# Patient Record
Sex: FEMALE | Race: WHITE | NOT HISPANIC OR LATINO | Employment: FULL TIME | URBAN - METROPOLITAN AREA
[De-identification: names, ages, dates, MRNs, and addresses within clinical notes are randomized per-mention and may not be internally consistent; named-entity substitution may affect disease eponyms.]

---

## 2017-11-07 ENCOUNTER — TELEPHONE (OUTPATIENT)
Dept: PRIMARY CARE CLINIC | Facility: CLINIC | Age: 32
End: 2017-11-07

## 2017-11-07 NOTE — TELEPHONE ENCOUNTER
----- Message from Saundra Martinez sent at 11/6/2017  8:27 AM CST -----  Contact: Patient  Marek, patient [phone], Calling for same day appointment, asthma. Please advise. Thanks.

## 2019-09-18 ENCOUNTER — OFFICE VISIT (OUTPATIENT)
Dept: PRIMARY CARE CLINIC | Facility: CLINIC | Age: 34
End: 2019-09-18
Payer: COMMERCIAL

## 2019-09-18 VITALS
HEIGHT: 61 IN | RESPIRATION RATE: 18 BRPM | HEART RATE: 65 BPM | BODY MASS INDEX: 30.96 KG/M2 | OXYGEN SATURATION: 100 % | WEIGHT: 164 LBS | TEMPERATURE: 99 F | SYSTOLIC BLOOD PRESSURE: 122 MMHG | DIASTOLIC BLOOD PRESSURE: 73 MMHG

## 2019-09-18 DIAGNOSIS — Z86.2 HISTORY OF ACQUIRED HEMOLYTIC ANEMIA: ICD-10-CM

## 2019-09-18 DIAGNOSIS — F41.9 ANXIETY: ICD-10-CM

## 2019-09-18 DIAGNOSIS — J45.909 ASTHMA, UNSPECIFIED ASTHMA SEVERITY, UNSPECIFIED WHETHER COMPLICATED, UNSPECIFIED WHETHER PERSISTENT: ICD-10-CM

## 2019-09-18 DIAGNOSIS — R53.83 LETHARGY: ICD-10-CM

## 2019-09-18 DIAGNOSIS — R53.1 WEAKNESS: ICD-10-CM

## 2019-09-18 DIAGNOSIS — R01.1 HEART MURMUR: ICD-10-CM

## 2019-09-18 PROCEDURE — 99213 OFFICE O/P EST LOW 20 MIN: CPT | Mod: S$GLB,,, | Performed by: FAMILY MEDICINE

## 2019-09-18 PROCEDURE — 3008F PR BODY MASS INDEX (BMI) DOCUMENTED: ICD-10-PCS | Mod: CPTII,S$GLB,, | Performed by: FAMILY MEDICINE

## 2019-09-18 PROCEDURE — 3008F BODY MASS INDEX DOCD: CPT | Mod: CPTII,S$GLB,, | Performed by: FAMILY MEDICINE

## 2019-09-18 PROCEDURE — 99213 PR OFFICE/OUTPT VISIT, EST, LEVL III, 20-29 MIN: ICD-10-PCS | Mod: S$GLB,,, | Performed by: FAMILY MEDICINE

## 2019-09-18 PROCEDURE — 99999 PR PBB SHADOW E&M-EST. PATIENT-LVL III: ICD-10-PCS | Mod: PBBFAC,,, | Performed by: FAMILY MEDICINE

## 2019-09-18 PROCEDURE — 99999 PR PBB SHADOW E&M-EST. PATIENT-LVL III: CPT | Mod: PBBFAC,,, | Performed by: FAMILY MEDICINE

## 2019-09-18 RX ORDER — MONTELUKAST SODIUM 10 MG/1
10 TABLET ORAL NIGHTLY
COMMUNITY
End: 2023-08-30 | Stop reason: SDUPTHER

## 2019-09-18 RX ORDER — ALBUTEROL SULFATE 0.63 MG/3ML
0.63 SOLUTION RESPIRATORY (INHALATION) EVERY 6 HOURS PRN
COMMUNITY
End: 2019-10-14 | Stop reason: SDUPTHER

## 2019-09-18 RX ORDER — FLUTICASONE PROPIONATE AND SALMETEROL XINAFOATE 230; 21 UG/1; UG/1
2 AEROSOL, METERED RESPIRATORY (INHALATION) 2 TIMES DAILY
COMMUNITY
End: 2021-02-10 | Stop reason: SDUPTHER

## 2019-09-18 NOTE — PROGRESS NOTES
Subjective:       Patient ID: Marek Thompson is a 33 y.o. female.    Chief Complaint: Establish Care; Fatigue; and Bleeding/Bruising    HPI:  33-year-old female in for new PCP--feeling weak recently 2-3 weeks--notice bruising on the posterior legs bilaterally--x 1 week--bruising does appear isolated to the legs.  Patient does have a history of anemia in the past requiring blood transfusions--since transfusions has had a hysterectomy.        No epistaxis, hemoptysis, hematuria, hematochezia or melena no vaginal bleed.     ROS:  Skin: no psoriasis, eczema, skin cancer  HEENT: No headache, ocular pain, blurred vision, diplopia, epistaxis, hoarseness change in voice, thyroid trouble  Lung: No pneumonia, +asthma--patient has inhalers--albuterol/Advair/Singulair--gets exacerbation with weather changes,no  Tb, wheezing, SOB,   Heart: No chest pain, ankle edema, palpitations, MI, +angy murmur,no  hypertension, hyperlipidemia --no stent bypass arrhythmia  Abdomen: No nausea, vomiting, diarrhea, constipation, ulcers, hepatitis, gallbladder disease, melena, hematochezia, hematemesis  : no UTI, renal disease, stones  GYN hyst no vaginal discharge  MS: no fractures, O/A, lupus, rheumatoid, gout  Neuro: No dizziness, LOC, seizures   No diabetes, +a nemia, + anxiety, no depression  , 2 children 10 and 4 , work [employer]  2 chills     Objective:   Physical Exam:  General: Well nourished, well developed, no acute distress no significant weakness at this time   Skin: No lesions  HEENT: Eyes PERRLA, EOM intact, nose patent, throat non-erythematous ears TMs clear  NECK: Supple, no bruits, No JVD, no nodes---thyroid is prominent  Lungs: Clear, no rales, rhonchi, wheezing  Heart: Regular rate and rhythm, no murmurs, gallops, or rubs--very mild heart murmur  Abdomen: flat, bowel sounds positive, no tenderness, or organomegaly  MS: Range of motion and muscle strength intact  Neuro: Alert, CN intact, oriented X  3  Extremities: No cyanosis, clubbing, or edema         Assessment:       1. Lethargy    2. Weakness    3. Asthma, unspecified asthma severity, unspecified whether complicated, unspecified whether persistent    4. History of acquired hemolytic anemia    5. Anxiety    6. Heart murmur        Plan:       Lethargy    Weakness    Asthma, unspecified asthma severity, unspecified whether complicated, unspecified whether persistent  -     CBC auto differential; Future; Expected date: 09/18/2019  -     Comprehensive metabolic panel; Future; Expected date: 09/18/2019  -     EKG 12-lead; Future  -     Fecal Immunochemical Test (iFOBT); Future; Expected date: 09/18/2019  -     Lipid panel; Future; Expected date: 09/18/2019  -     X-Ray Chest PA And Lateral; Future; Expected date: 09/18/2019  -     POCT urine dipstick without microscope  -     T4, free; Future; Expected date: 09/18/2019  -     TSH; Future; Expected date: 09/18/2019    History of acquired hemolytic anemia    Anxiety    Heart murmur      patient with weakness/lethargy/bruising posterior legs/history of anemia with transfusion in the past status post hysterectomy/anxiety/slightly prominent thyroid/question heart murmur--  Patient needs routine lab CBCs CMP lipids T4 TSH stool guaiac UA chest x-ray EKG is physical  After lab done multi vitamin B12--if anemic will need anemia workup--if lethargy stays may need echocardiogram evaluate borderline heart murmur--and may need thyroid scan for slightly prominent thyroid

## 2019-09-30 ENCOUNTER — TELEPHONE (OUTPATIENT)
Dept: PRIMARY CARE CLINIC | Facility: CLINIC | Age: 34
End: 2019-09-30

## 2019-09-30 NOTE — TELEPHONE ENCOUNTER
----- Message from Jazmine Sullivan sent at 9/30/2019  9:13 AM CDT -----  Contact: pt [phone]  Patient would like to get test results  Name of test (lab, mammo, etc.):   Labs,xray  Date of test:  9/26  Ordering provider: Prisca  Where was the test performed:    Would the patient rather a call back or a response via [facility]?:  Call back   Comments:

## 2019-10-14 NOTE — TELEPHONE ENCOUNTER
----- Message from Saundra Martinez sent at 10/14/2019  1:05 PM CDT -----  Contact: Patient  Type:  RX Refill Request    Who Called:  Marek, patient  Refill or New Rx:  Refill  RX Name and Strength:  albuterol (ACCUNEB) 0.63 mg/3 mL Nebu  How is the patient currently taking it? (ex. 1XDay):  Take 0.63 mg by nebulization every 6 (six) hours as needed  Is this a 30 day or 90 day RX:  ?  Preferred Pharmacy with phone number:    [facility]/pharmacy #[id number] - [place] - [address]  [address] [address]  Phone: [phone] Fax: [phone]  Local or Mail Order:  Local  Ordering Provider:  Dr Prisca Gerber Call Back Number:  [phone]  Additional Information:  Please advise. Thanks.

## 2019-10-15 RX ORDER — ALBUTEROL SULFATE 0.63 MG/3ML
0.63 SOLUTION RESPIRATORY (INHALATION) EVERY 6 HOURS PRN
Qty: 1 BOX | Refills: 5 | Status: SHIPPED | OUTPATIENT
Start: 2019-10-15

## 2020-02-06 ENCOUNTER — OFFICE VISIT (OUTPATIENT)
Dept: PRIMARY CARE CLINIC | Facility: CLINIC | Age: 35
End: 2020-02-06
Payer: COMMERCIAL

## 2020-02-06 VITALS
TEMPERATURE: 98 F | DIASTOLIC BLOOD PRESSURE: 78 MMHG | WEIGHT: 161.25 LBS | OXYGEN SATURATION: 97 % | HEIGHT: 61 IN | SYSTOLIC BLOOD PRESSURE: 102 MMHG | BODY MASS INDEX: 30.44 KG/M2 | RESPIRATION RATE: 16 BRPM | HEART RATE: 71 BPM

## 2020-02-06 DIAGNOSIS — Z86.2 HISTORY OF ACQUIRED HEMOLYTIC ANEMIA: ICD-10-CM

## 2020-02-06 DIAGNOSIS — J32.9 SINUSITIS, UNSPECIFIED CHRONICITY, UNSPECIFIED LOCATION: ICD-10-CM

## 2020-02-06 DIAGNOSIS — J45.41 MODERATE PERSISTENT ASTHMA WITH EXACERBATION: ICD-10-CM

## 2020-02-06 DIAGNOSIS — J40 BRONCHITIS: ICD-10-CM

## 2020-02-06 DIAGNOSIS — R01.1 HEART MURMUR: Primary | ICD-10-CM

## 2020-02-06 PROCEDURE — 99213 PR OFFICE/OUTPT VISIT, EST, LEVL III, 20-29 MIN: ICD-10-PCS | Mod: 25,S$GLB,, | Performed by: FAMILY MEDICINE

## 2020-02-06 PROCEDURE — 96372 THER/PROPH/DIAG INJ SC/IM: CPT | Mod: S$GLB,,, | Performed by: FAMILY MEDICINE

## 2020-02-06 PROCEDURE — 99999 PR PBB SHADOW E&M-EST. PATIENT-LVL III: ICD-10-PCS | Mod: PBBFAC,,, | Performed by: FAMILY MEDICINE

## 2020-02-06 PROCEDURE — 3008F BODY MASS INDEX DOCD: CPT | Mod: CPTII,S$GLB,, | Performed by: FAMILY MEDICINE

## 2020-02-06 PROCEDURE — 96372 PR INJECTION,THERAP/PROPH/DIAG2ST, IM OR SUBCUT: ICD-10-PCS | Mod: S$GLB,,, | Performed by: FAMILY MEDICINE

## 2020-02-06 PROCEDURE — 99999 PR PBB SHADOW E&M-EST. PATIENT-LVL III: CPT | Mod: PBBFAC,,, | Performed by: FAMILY MEDICINE

## 2020-02-06 PROCEDURE — 99213 OFFICE O/P EST LOW 20 MIN: CPT | Mod: 25,S$GLB,, | Performed by: FAMILY MEDICINE

## 2020-02-06 PROCEDURE — 3008F PR BODY MASS INDEX (BMI) DOCUMENTED: ICD-10-PCS | Mod: CPTII,S$GLB,, | Performed by: FAMILY MEDICINE

## 2020-02-06 RX ORDER — IPRATROPIUM BROMIDE AND ALBUTEROL SULFATE 2.5; .5 MG/3ML; MG/3ML
3 SOLUTION RESPIRATORY (INHALATION) EVERY 6 HOURS PRN
Qty: 1 BOX | Refills: 0 | Status: SHIPPED | OUTPATIENT
Start: 2020-02-06 | End: 2021-02-10 | Stop reason: SDUPTHER

## 2020-02-06 RX ORDER — LEVOFLOXACIN 500 MG/1
500 TABLET, FILM COATED ORAL DAILY
Qty: 10 TABLET | Refills: 0 | Status: SHIPPED | OUTPATIENT
Start: 2020-02-06 | End: 2020-02-16

## 2020-02-06 RX ORDER — BETAMETHASONE SODIUM PHOSPHATE AND BETAMETHASONE ACETATE 3; 3 MG/ML; MG/ML
12 INJECTION, SUSPENSION INTRA-ARTICULAR; INTRALESIONAL; INTRAMUSCULAR; SOFT TISSUE
Status: COMPLETED | OUTPATIENT
Start: 2020-02-06 | End: 2020-02-06

## 2020-02-06 RX ORDER — ALBUTEROL SULFATE 90 UG/1
1-2 AEROSOL, METERED RESPIRATORY (INHALATION) EVERY 4 HOURS PRN
Qty: 18 G | Refills: 0 | Status: SHIPPED | OUTPATIENT
Start: 2020-02-06 | End: 2021-02-05

## 2020-02-06 RX ORDER — CEFTRIAXONE 1 G/1
1 INJECTION, POWDER, FOR SOLUTION INTRAMUSCULAR; INTRAVENOUS
Status: COMPLETED | OUTPATIENT
Start: 2020-02-06 | End: 2020-02-06

## 2020-02-06 RX ORDER — PROMETHAZINE HYDROCHLORIDE AND CODEINE PHOSPHATE 6.25; 1 MG/5ML; MG/5ML
5 SOLUTION ORAL EVERY 6 HOURS PRN
Qty: 180 ML | Refills: 0 | Status: SHIPPED | OUTPATIENT
Start: 2020-02-06 | End: 2020-04-06 | Stop reason: SDUPTHER

## 2020-02-06 RX ORDER — METHYLPREDNISOLONE 4 MG/1
TABLET ORAL
Qty: 1 PACKAGE | Refills: 0 | Status: SHIPPED | OUTPATIENT
Start: 2020-02-06 | End: 2021-02-10 | Stop reason: SDUPTHER

## 2020-02-06 RX ADMIN — CEFTRIAXONE 1 G: 1 INJECTION, POWDER, FOR SOLUTION INTRAMUSCULAR; INTRAVENOUS at 05:02

## 2020-02-06 RX ADMIN — BETAMETHASONE SODIUM PHOSPHATE AND BETAMETHASONE ACETATE 12 MG: 3; 3 INJECTION, SUSPENSION INTRA-ARTICULAR; INTRALESIONAL; INTRAMUSCULAR; SOFT TISSUE at 05:02

## 2020-02-06 NOTE — PROGRESS NOTES
Verified pt ID using name and . NKDA. Administered 12 mg celestone in left VG and 1 gram rocephin in right VG per physician order using aseptic technique. Aspirated and no blood return noted. Pt tolerated well with no adverse reactions noted.

## 2020-02-06 NOTE — PROGRESS NOTES
Subjective:       Patient ID: Marek Thompson is a 34 y.o. female.    Chief Complaint: Asthma    HPI:  34 yr-old female --history of asthma since she was born--has albuterol for rescue medicine and add bear as her maintenance medicines--minimal relief for the past few days. , no fever +runny stuffy nose, +sore throat yesterday better today,, +cough-no phlegm +wheezing--mainly at night.  No pneumonia TB.  No smoking     ROS:  Skin: no psoriasis, eczema, skin cancer  HEENT: + headache--with sinus, no  ocular pain, blurred vision, diplopia, epistaxis, hoarseness change in voice, thyroid trouble  Lung: No pneumonia, +asthma--patient has inhalers--albuterol/Advair/Singulair--gets exacerbation with weather changes,no  Tb, wheezing, SOB,   Heart:  Midsternal chest pain--heavy all day,no  ankle edema, palpitations, MI, +angy murmur,no  hypertension, hyperlipidemia --no stent bypass arrhythmia  Abdomen: No nausea, vomiting, diarrhea, constipation, ulcers, hepatitis, gallbladder disease, melena, hematochezia, hematemesis  : no UTI, renal disease, stones  GYN hyst no vaginal discharge  MS: no fractures, O/A, lupus, rheumatoid, gout  Neuro: No dizziness, LOC, seizures   No diabetes, +anemia, no  anxiety, no depression  , 2 children 10 and 4 , work [employer]  2 chills     Objective:   Physical Exam:  General: Well nourished, well developed, no acute distress no significant weakness at this time   Skin: No lesions  HEENT: Eyes PERRLA, EOM intact, nose clear discharge--erythema around the nose for blowing it, throat 1/4 e rythematous ears TMs clear  NECK: Supple, no bruits, No JVD, no nodes---thyroid is prominent  Lungs: Clear, no rales, + rhonchi, + wheezing no rales  Heart: Regular rate and rhythm, no murmurs, gallops, or rubs--very mild heart murmur  Abdomen: flat, bowel sounds positive, no tenderness, or organomegaly  MS: Range of motion and muscle strength intact  Neuro: Alert, CN intact, oriented X  3  Extremities: No cyanosis, clubbing, or edema         Assessment:       1. Heart murmur    2. Moderate persistent asthma with exacerbation    3. Sinusitis, unspecified chronicity, unspecified location    4. Bronchitis    5. History of acquired hemolytic anemia        Plan:       Heart murmur    Moderate persistent asthma with exacerbation  -     albuterol-ipratropium (DUO-NEB) 2.5 mg-0.5 mg/3 mL nebulizer solution; Take 3 mLs by nebulization every 6 (six) hours as needed for Wheezing. Rescue  Dispense: 1 Box; Refill: 0  -     methylPREDNISolone (MEDROL DOSEPACK) 4 mg tablet; use as directed  Dispense: 1 Package; Refill: 0    Sinusitis, unspecified chronicity, unspecified location    Bronchitis    History of acquired hemolytic anemia    Other orders  -     albuterol (PROVENTIL/VENTOLIN HFA) 90 mcg/actuation inhaler; Inhale 1-2 puffs into the lungs every 4 (four) hours as needed. Rescue  Dispense: 18 g; Refill: 0  -     cefTRIAXone injection 1 g  -     betamethasone acetate-betamethasone sodium phosphate injection 12 mg  -     levoFLOXacin (LEVAQUIN) 500 MG tablet; Take 1 tablet (500 mg total) by mouth once daily. for 10 days  Dispense: 10 tablet; Refill: 0  -     promethazine-codeine 6.25-10 mg/5 ml (PHENERGAN WITH CODEINE) 6.25-10 mg/5 mL syrup; Take 5 mLs by mouth every 6 (six) hours as needed for Cough.  Dispense: 180 mL; Refill: 0      cold/bronchospasm--Rocephin/Celestone/Levaquin/Medrol/Phenergan with codeine--patient should use albuterol inhaler or aerosol nebulizer treatment with DuoNeb q.6 hours p.r.n. wheezing continue Advair b.i.d.  If not better Monday chest x-ray  Last lab done in September no need to repeat lab until 1 year  Health maintenance HIV tetanus flu shot  If symptoms get progressively worse increased shortness breath increase wheezing go to the emergency room for possible admit for IV Zosyn/IV Solu-Medrol aerosol nebulizer treatment

## 2020-04-06 ENCOUNTER — OFFICE VISIT (OUTPATIENT)
Dept: PRIMARY CARE CLINIC | Facility: CLINIC | Age: 35
End: 2020-04-06
Payer: COMMERCIAL

## 2020-04-06 ENCOUNTER — PATIENT MESSAGE (OUTPATIENT)
Dept: PRIMARY CARE CLINIC | Facility: CLINIC | Age: 35
End: 2020-04-06

## 2020-04-06 DIAGNOSIS — J45.909 ASTHMA, UNSPECIFIED ASTHMA SEVERITY, UNSPECIFIED WHETHER COMPLICATED, UNSPECIFIED WHETHER PERSISTENT: ICD-10-CM

## 2020-04-06 DIAGNOSIS — F41.9 ANXIETY: ICD-10-CM

## 2020-04-06 DIAGNOSIS — R51.9 NONINTRACTABLE HEADACHE, UNSPECIFIED CHRONICITY PATTERN, UNSPECIFIED HEADACHE TYPE: ICD-10-CM

## 2020-04-06 DIAGNOSIS — Z86.2 HISTORY OF ACQUIRED HEMOLYTIC ANEMIA: ICD-10-CM

## 2020-04-06 DIAGNOSIS — R01.1 HEART MURMUR: ICD-10-CM

## 2020-04-06 DIAGNOSIS — J40 BRONCHITIS: Primary | ICD-10-CM

## 2020-04-06 PROCEDURE — 99213 PR OFFICE/OUTPT VISIT, EST, LEVL III, 20-29 MIN: ICD-10-PCS | Mod: 95,,, | Performed by: FAMILY MEDICINE

## 2020-04-06 PROCEDURE — 99213 OFFICE O/P EST LOW 20 MIN: CPT | Mod: 95,,, | Performed by: FAMILY MEDICINE

## 2020-04-06 RX ORDER — PROMETHAZINE HYDROCHLORIDE AND CODEINE PHOSPHATE 6.25; 1 MG/5ML; MG/5ML
5 SOLUTION ORAL EVERY 6 HOURS PRN
Qty: 180 ML | Refills: 0 | Status: SHIPPED | OUTPATIENT
Start: 2020-04-06 | End: 2021-02-10 | Stop reason: SDUPTHER

## 2020-04-06 RX ORDER — ALBUTEROL SULFATE 0.63 MG/3ML
0.63 SOLUTION RESPIRATORY (INHALATION) EVERY 6 HOURS PRN
Qty: 1 BOX | Refills: 0 | Status: SHIPPED | OUTPATIENT
Start: 2020-04-06 | End: 2021-02-10 | Stop reason: SDUPTHER

## 2020-04-06 RX ORDER — AZITHROMYCIN 250 MG/1
TABLET, FILM COATED ORAL
Qty: 6 TABLET | Refills: 0 | Status: SHIPPED | OUTPATIENT
Start: 2020-04-06 | End: 2020-04-10

## 2020-04-06 RX ORDER — ALBUTEROL SULFATE 0.63 MG/3ML
0.63 SOLUTION RESPIRATORY (INHALATION) EVERY 6 HOURS PRN
Qty: 1 BOX | Refills: 5 | Status: CANCELLED | OUTPATIENT
Start: 2020-04-06

## 2020-04-06 NOTE — PROGRESS NOTES
Subjective:       Patient ID: Marek Thompson is a 34 y.o. female.    Chief Complaint: No chief complaint on file.    HPI: The patient location is:  home  The chief complaint leading to consultation is:  Asthma with URI  Visit type: Virtual visit with synchronous audio and video  Total time spent with patient:  15 min  Each patient to whom he or she provides medical services by telemedicine is:  (1) informed of the relationship between the physician and patient and the respective role of any other health care provider with respect to management of the patient; and (2) notified that he or she may decline to receive medical services by telemedicine and may withdraw from such care at any time.    Notes:  History of present illness 34 yr-old female --yesterday morning woke up with asthma --+wheezing--some heaviness to the chest---congested.  No fever---+runny stuffy nose --no sore throat --+cough-no phlegm.  No pneumonia TB.  No smokingLMP hyst    ROS:   Skin: no psoriasis, eczema, skin cancer  HEENT: + occasional headaches in the morning as wakes up goes away with Tylenol---to the frontal area--gets at a lot lately--felt was secondary to allergies but getting it frequently lately no  ocular pain, blurred vision, diplopia, epistaxis, hoarseness change in voice, thyroid trouble  Lung: No pneumonia, +asthma--patient has inhalers--albuterol/Advair/Singulair--gets exacerbation with weather changes,no  Tb, wheezing, SOB,   Heart:  Midsternal chest pain--heavy all day,no  ankle edema, palpitations, MI, +angy murmur,no  hypertension, hyperlipidemia --no stent bypass arrhythmia  Abdomen: No nausea, vomiting, diarrhea, constipation, ulcers, hepatitis, gallbladder disease, melena, hematochezia, hematemesis  : no UTI, renal disease, stones  GYN hyst no vaginal discharge  MS: no fractures, O/A, lupus, rheumatoid, gout  Neuro: No dizziness, LOC, seizures   No diabetes, +anemia, no  anxiety, no depression  , 2 children  ,  work [employer]  2 children    Objective:   Physical Exam:  This is a telemedicine visit so physical exam was not done physical below from prior office visit   General: Well nourished, well developed, no acute distress no significant weakness at this time   Skin: No lesions  HEENT: Eyes PERRLA, EOM intact, nose clear discharge--erythema around the nose for blowing it, throat 1/4 e rythematous ears TMs clear  NECK: Supple, no bruits, No JVD, no nodes---thyroid is prominent  Lungs: Clear, no rales, + rhonchi, + wheezing no rales  Heart: Regular rate and rhythm, no murmurs, gallops, or rubs--very mild heart murmur  Abdomen: flat, bowel sounds positive, no tenderness, or organomegaly  MS: Range of motion and muscle strength intact  Neuro: Alert, CN intact, oriented X 3  Extremities: No cyanosis, clubbing, or edema         Assessment:       1. Bronchitis    2. Asthma, unspecified asthma severity, unspecified whether complicated, unspecified whether persistent    3. Nonintractable headache, unspecified chronicity pattern, unspecified headache type    4. Heart murmur    5. History of acquired hemolytic anemia    6. Anxiety        Plan:       Bronchitis    Asthma, unspecified asthma severity, unspecified whether complicated, unspecified whether persistent    Nonintractable headache, unspecified chronicity pattern, unspecified headache type    Heart murmur    History of acquired hemolytic anemia    Anxiety    Other orders  -     azithromycin (Z-CHRISTIANE) 250 MG tablet; 2 tabs by mouth day 1, then 1 tab by mouth daily x 4 days  Dispense: 6 tablet; Refill: 0  -     promethazine-codeine 6.25-10 mg/5 ml (PHENERGAN WITH CODEINE) 6.25-10 mg/5 mL syrup; Take 5 mLs by mouth every 6 (six) hours as needed for Cough.  Dispense: 180 mL; Refill: 0     -cold--asthma--Zithromax/Phenergan with codeine--usually will give steroids but due to the Corona virus will hold unless absolutely necessary--patient has albuterol inhaler 2  puffs q.6 hours p.r.n. wheezing or cough   Lab--last lab was September 2019 could Redo lab in 6 months with CBCs CMP lipids or weight till September  Health maintenance HIV tetanus flu  History asthma headaches heart murmur anemia hysterectomy  Reviewed COVID--fever/occipital headache/runny nose sore throat cough mild/diarrhea/soreness to the chest/achy joints/loss of sense of smell or taste--to ER few gets severe headaches with temperature is a 102° with shortness of breath with minimal exertion or disorientation due to hypoxia  Patient did not want referral to the COVID out patient monitoring program

## 2020-04-14 ENCOUNTER — TELEPHONE (OUTPATIENT)
Dept: PRIMARY CARE CLINIC | Facility: CLINIC | Age: 35
End: 2020-04-14

## 2020-04-14 NOTE — TELEPHONE ENCOUNTER
Spoke to patient. Let her know since she is not showing any symptoms right now, she does not need to be tested. Signed pt up for COVID home symptom monitoring and then instructed patient to self quarantine for the next 14 days, stated understanding

## 2020-04-14 NOTE — TELEPHONE ENCOUNTER
----- Message from Amy Melendez sent at 4/14/2020  1:10 PM CDT -----  Contact: patient [phone]  Patient's mother in law tested positive for covid-19 today and she was with her in the last 2 days. Patient currently has no sx but  has asthma and is concerned as to whether or not she should be tested. If so, please enter the order in Epic and let patient know.

## 2020-04-20 ENCOUNTER — TELEPHONE (OUTPATIENT)
Dept: PRIMARY CARE CLINIC | Facility: CLINIC | Age: 35
End: 2020-04-20

## 2020-04-20 NOTE — TELEPHONE ENCOUNTER
----- Message from Saundra Juan sent at 4/20/2020  3:27 PM CDT -----  Contact: Patient  Type: Needs Medical Advice    Who Called:  Marek patient  Symptoms (please be specific):  N/A  How long has patient had these symptoms:  N/A  Pharmacy name and phone #:  N/A  Best Call Back Number: [phone]  Additional Information: Calling to get a note that she needs to self quarantine because her mother in law tested positive for COVID 19 and she was with her. Please advise. Thanks.

## 2021-02-10 ENCOUNTER — TELEPHONE (OUTPATIENT)
Dept: PRIMARY CARE CLINIC | Facility: CLINIC | Age: 36
End: 2021-02-10

## 2021-02-10 ENCOUNTER — NURSE TRIAGE (OUTPATIENT)
Dept: ADMINISTRATIVE | Facility: CLINIC | Age: 36
End: 2021-02-10

## 2021-02-10 ENCOUNTER — OFFICE VISIT (OUTPATIENT)
Dept: PRIMARY CARE CLINIC | Facility: CLINIC | Age: 36
End: 2021-02-10
Payer: COMMERCIAL

## 2021-02-10 VITALS
TEMPERATURE: 99 F | SYSTOLIC BLOOD PRESSURE: 122 MMHG | WEIGHT: 152.31 LBS | HEIGHT: 61 IN | HEART RATE: 73 BPM | DIASTOLIC BLOOD PRESSURE: 64 MMHG | BODY MASS INDEX: 28.76 KG/M2 | OXYGEN SATURATION: 100 %

## 2021-02-10 DIAGNOSIS — J40 BRONCHITIS: ICD-10-CM

## 2021-02-10 DIAGNOSIS — Z11.59 NEED FOR HEPATITIS C SCREENING TEST: ICD-10-CM

## 2021-02-10 DIAGNOSIS — Z11.4 ENCOUNTER FOR SCREENING FOR HIV: Primary | ICD-10-CM

## 2021-02-10 DIAGNOSIS — J45.909 ASTHMA, UNSPECIFIED ASTHMA SEVERITY, UNSPECIFIED WHETHER COMPLICATED, UNSPECIFIED WHETHER PERSISTENT: ICD-10-CM

## 2021-02-10 DIAGNOSIS — J45.41 MODERATE PERSISTENT ASTHMA WITH EXACERBATION: ICD-10-CM

## 2021-02-10 DIAGNOSIS — R05.9 COUGH: ICD-10-CM

## 2021-02-10 PROCEDURE — 99213 OFFICE O/P EST LOW 20 MIN: CPT | Mod: 25,S$GLB,CS, | Performed by: FAMILY MEDICINE

## 2021-02-10 PROCEDURE — 94640 PR INHAL RX, AIRWAY OBST/DX SPUTUM INDUCT: ICD-10-PCS | Mod: 59,S$GLB,, | Performed by: FAMILY MEDICINE

## 2021-02-10 PROCEDURE — 94640 AIRWAY INHALATION TREATMENT: CPT | Mod: 59,S$GLB,, | Performed by: FAMILY MEDICINE

## 2021-02-10 PROCEDURE — 96372 THER/PROPH/DIAG INJ SC/IM: CPT | Mod: S$GLB,,, | Performed by: FAMILY MEDICINE

## 2021-02-10 PROCEDURE — 1125F PR PAIN SEVERITY QUANTIFIED, PAIN PRESENT: ICD-10-PCS | Mod: S$GLB,,, | Performed by: FAMILY MEDICINE

## 2021-02-10 PROCEDURE — 99999 PR PBB SHADOW E&M-EST. PATIENT-LVL III: CPT | Mod: PBBFAC,,, | Performed by: FAMILY MEDICINE

## 2021-02-10 PROCEDURE — 3008F PR BODY MASS INDEX (BMI) DOCUMENTED: ICD-10-PCS | Mod: CPTII,S$GLB,, | Performed by: FAMILY MEDICINE

## 2021-02-10 PROCEDURE — 96372 PR INJECTION,THERAP/PROPH/DIAG2ST, IM OR SUBCUT: ICD-10-PCS | Mod: S$GLB,,, | Performed by: FAMILY MEDICINE

## 2021-02-10 PROCEDURE — 99213 PR OFFICE/OUTPT VISIT, EST, LEVL III, 20-29 MIN: ICD-10-PCS | Mod: 25,S$GLB,CS, | Performed by: FAMILY MEDICINE

## 2021-02-10 PROCEDURE — 99999 PR PBB SHADOW E&M-EST. PATIENT-LVL III: ICD-10-PCS | Mod: PBBFAC,,, | Performed by: FAMILY MEDICINE

## 2021-02-10 PROCEDURE — 3008F BODY MASS INDEX DOCD: CPT | Mod: CPTII,S$GLB,, | Performed by: FAMILY MEDICINE

## 2021-02-10 PROCEDURE — 1125F AMNT PAIN NOTED PAIN PRSNT: CPT | Mod: S$GLB,,, | Performed by: FAMILY MEDICINE

## 2021-02-10 RX ORDER — TRIAMCINOLONE ACETONIDE 40 MG/ML
40 INJECTION, SUSPENSION INTRA-ARTICULAR; INTRAMUSCULAR ONCE
Status: COMPLETED | OUTPATIENT
Start: 2021-02-10 | End: 2021-02-10

## 2021-02-10 RX ORDER — ALBUTEROL SULFATE 0.63 MG/3ML
0.63 SOLUTION RESPIRATORY (INHALATION) EVERY 6 HOURS PRN
Qty: 1 BOX | Refills: 0 | Status: SHIPPED | OUTPATIENT
Start: 2021-02-10 | End: 2021-03-02

## 2021-02-10 RX ORDER — IPRATROPIUM BROMIDE AND ALBUTEROL SULFATE 2.5; .5 MG/3ML; MG/3ML
3 SOLUTION RESPIRATORY (INHALATION)
Status: COMPLETED | OUTPATIENT
Start: 2021-02-10 | End: 2021-02-10

## 2021-02-10 RX ORDER — PROMETHAZINE HYDROCHLORIDE AND CODEINE PHOSPHATE 6.25; 1 MG/5ML; MG/5ML
5 SOLUTION ORAL EVERY 6 HOURS PRN
Qty: 180 ML | Refills: 0 | Status: SHIPPED | OUTPATIENT
Start: 2021-02-10 | End: 2021-03-02

## 2021-02-10 RX ORDER — FLUTICASONE PROPIONATE AND SALMETEROL XINAFOATE 230; 21 UG/1; UG/1
2 AEROSOL, METERED RESPIRATORY (INHALATION) 2 TIMES DAILY
Qty: 3 INHALER | Refills: 2 | Status: SHIPPED | OUTPATIENT
Start: 2021-02-10 | End: 2021-06-25 | Stop reason: SDUPTHER

## 2021-02-10 RX ORDER — METHYLPREDNISOLONE 4 MG/1
TABLET ORAL
Qty: 1 PACKAGE | Refills: 0 | Status: SHIPPED | OUTPATIENT
Start: 2021-02-10 | End: 2021-03-02

## 2021-02-10 RX ORDER — IPRATROPIUM BROMIDE AND ALBUTEROL SULFATE 2.5; .5 MG/3ML; MG/3ML
3 SOLUTION RESPIRATORY (INHALATION) EVERY 6 HOURS PRN
Qty: 1 BOX | Refills: 0 | Status: SHIPPED | OUTPATIENT
Start: 2021-02-10 | End: 2021-06-25 | Stop reason: SDUPTHER

## 2021-02-10 RX ORDER — AZITHROMYCIN 250 MG/1
TABLET, FILM COATED ORAL
Qty: 6 TABLET | Refills: 0 | Status: SHIPPED | OUTPATIENT
Start: 2021-02-10 | End: 2021-02-14

## 2021-02-10 RX ADMIN — IPRATROPIUM BROMIDE AND ALBUTEROL SULFATE 3 ML: 2.5; .5 SOLUTION RESPIRATORY (INHALATION) at 03:02

## 2021-02-10 RX ADMIN — TRIAMCINOLONE ACETONIDE 40 MG: 40 INJECTION, SUSPENSION INTRA-ARTICULAR; INTRAMUSCULAR at 01:02

## 2021-02-12 ENCOUNTER — PATIENT MESSAGE (OUTPATIENT)
Dept: PRIMARY CARE CLINIC | Facility: CLINIC | Age: 36
End: 2021-02-12

## 2021-02-13 DIAGNOSIS — R79.89 ELEVATED TSH: Primary | ICD-10-CM

## 2021-02-15 ENCOUNTER — TELEPHONE (OUTPATIENT)
Dept: PRIMARY CARE CLINIC | Facility: CLINIC | Age: 36
End: 2021-02-15

## 2021-02-17 ENCOUNTER — TELEPHONE (OUTPATIENT)
Dept: PRIMARY CARE CLINIC | Facility: CLINIC | Age: 36
End: 2021-02-17

## 2021-02-22 DIAGNOSIS — E01.0 THYROMEGALY: Primary | ICD-10-CM

## 2021-02-24 ENCOUNTER — TELEPHONE (OUTPATIENT)
Dept: PRIMARY CARE CLINIC | Facility: CLINIC | Age: 36
End: 2021-02-24

## 2021-03-01 ENCOUNTER — PATIENT OUTREACH (OUTPATIENT)
Dept: ADMINISTRATIVE | Facility: OTHER | Age: 36
End: 2021-03-01

## 2021-03-02 ENCOUNTER — OFFICE VISIT (OUTPATIENT)
Dept: ENDOCRINOLOGY | Facility: CLINIC | Age: 36
End: 2021-03-02
Payer: COMMERCIAL

## 2021-03-02 VITALS
BODY MASS INDEX: 28.66 KG/M2 | SYSTOLIC BLOOD PRESSURE: 106 MMHG | DIASTOLIC BLOOD PRESSURE: 77 MMHG | OXYGEN SATURATION: 99 % | HEIGHT: 61 IN | HEART RATE: 62 BPM | WEIGHT: 151.81 LBS

## 2021-03-02 DIAGNOSIS — E03.8 SUBCLINICAL HYPOTHYROIDISM: ICD-10-CM

## 2021-03-02 DIAGNOSIS — E01.0 THYROMEGALY: ICD-10-CM

## 2021-03-02 DIAGNOSIS — E06.3 HASHIMOTO'S THYROIDITIS: ICD-10-CM

## 2021-03-02 PROCEDURE — 99204 PR OFFICE/OUTPT VISIT, NEW, LEVL IV, 45-59 MIN: ICD-10-PCS | Mod: S$GLB,,, | Performed by: INTERNAL MEDICINE

## 2021-03-02 PROCEDURE — 1125F PR PAIN SEVERITY QUANTIFIED, PAIN PRESENT: ICD-10-PCS | Mod: S$GLB,,, | Performed by: INTERNAL MEDICINE

## 2021-03-02 PROCEDURE — 1125F AMNT PAIN NOTED PAIN PRSNT: CPT | Mod: S$GLB,,, | Performed by: INTERNAL MEDICINE

## 2021-03-02 PROCEDURE — 99999 PR PBB SHADOW E&M-EST. PATIENT-LVL IV: CPT | Mod: PBBFAC,,, | Performed by: INTERNAL MEDICINE

## 2021-03-02 PROCEDURE — 99204 OFFICE O/P NEW MOD 45 MIN: CPT | Mod: S$GLB,,, | Performed by: INTERNAL MEDICINE

## 2021-03-02 PROCEDURE — 99999 PR PBB SHADOW E&M-EST. PATIENT-LVL IV: ICD-10-PCS | Mod: PBBFAC,,, | Performed by: INTERNAL MEDICINE

## 2021-03-02 PROCEDURE — 3008F BODY MASS INDEX DOCD: CPT | Mod: CPTII,S$GLB,, | Performed by: INTERNAL MEDICINE

## 2021-03-02 PROCEDURE — 3008F PR BODY MASS INDEX (BMI) DOCUMENTED: ICD-10-PCS | Mod: CPTII,S$GLB,, | Performed by: INTERNAL MEDICINE

## 2021-04-26 ENCOUNTER — PATIENT MESSAGE (OUTPATIENT)
Dept: RESEARCH | Facility: HOSPITAL | Age: 36
End: 2021-04-26

## 2021-06-18 ENCOUNTER — OFFICE VISIT (OUTPATIENT)
Dept: PRIMARY CARE CLINIC | Facility: CLINIC | Age: 36
End: 2021-06-18
Payer: COMMERCIAL

## 2021-06-18 VITALS
HEART RATE: 69 BPM | HEIGHT: 61 IN | RESPIRATION RATE: 18 BRPM | BODY MASS INDEX: 28.4 KG/M2 | WEIGHT: 150.44 LBS | SYSTOLIC BLOOD PRESSURE: 108 MMHG | OXYGEN SATURATION: 99 % | DIASTOLIC BLOOD PRESSURE: 68 MMHG | TEMPERATURE: 98 F

## 2021-06-18 DIAGNOSIS — R10.13 EPIGASTRIC PAIN: Primary | ICD-10-CM

## 2021-06-18 DIAGNOSIS — R10.32 LEFT LOWER QUADRANT PAIN: ICD-10-CM

## 2021-06-18 LAB
B-HCG UR QL: NEGATIVE
BACTERIA #/AREA URNS HPF: NORMAL /HPF
BILIRUB SERPL-MCNC: ABNORMAL MG/DL
BILIRUB UR QL STRIP: NEGATIVE
BLOOD URINE, POC: ABNORMAL
CLARITY UR: CLEAR
CLARITY, POC UA: ABNORMAL
COLOR UR: YELLOW
COLOR, POC UA: YELLOW
CTP QC/QA: YES
GLUCOSE UR QL STRIP: ABNORMAL
GLUCOSE UR QL STRIP: NEGATIVE
HGB UR QL STRIP: NEGATIVE
KETONES UR QL STRIP: ABNORMAL
KETONES UR QL STRIP: NEGATIVE
LEUKOCYTE ESTERASE UR QL STRIP: ABNORMAL
LEUKOCYTE ESTERASE URINE, POC: ABNORMAL
MICROSCOPIC COMMENT: NORMAL
NITRITE UR QL STRIP: NEGATIVE
NITRITE, POC UA: ABNORMAL
PH UR STRIP: 7 [PH] (ref 5–8)
PH, POC UA: 5
PROT UR QL STRIP: NEGATIVE
PROTEIN, POC: ABNORMAL
RBC #/AREA URNS HPF: 1 /HPF (ref 0–4)
SP GR UR STRIP: 1.02 (ref 1–1.03)
SPECIFIC GRAVITY, POC UA: 1.01
SQUAMOUS #/AREA URNS HPF: 3 /HPF
URN SPEC COLLECT METH UR: ABNORMAL
UROBILINOGEN UR STRIP-ACNC: NEGATIVE EU/DL
UROBILINOGEN, POC UA: ABNORMAL
WBC #/AREA URNS HPF: 2 /HPF (ref 0–5)

## 2021-06-18 PROCEDURE — 81002 POCT URINE DIPSTICK WITHOUT MICROSCOPE: ICD-10-PCS | Mod: S$GLB,,, | Performed by: STUDENT IN AN ORGANIZED HEALTH CARE EDUCATION/TRAINING PROGRAM

## 2021-06-18 PROCEDURE — 99999 PR PBB SHADOW E&M-EST. PATIENT-LVL IV: ICD-10-PCS | Mod: PBBFAC,,, | Performed by: STUDENT IN AN ORGANIZED HEALTH CARE EDUCATION/TRAINING PROGRAM

## 2021-06-18 PROCEDURE — 3008F BODY MASS INDEX DOCD: CPT | Mod: CPTII,S$GLB,, | Performed by: STUDENT IN AN ORGANIZED HEALTH CARE EDUCATION/TRAINING PROGRAM

## 2021-06-18 PROCEDURE — 81025 URINE PREGNANCY TEST: CPT | Mod: S$GLB,,, | Performed by: STUDENT IN AN ORGANIZED HEALTH CARE EDUCATION/TRAINING PROGRAM

## 2021-06-18 PROCEDURE — 81002 URINALYSIS NONAUTO W/O SCOPE: CPT | Mod: S$GLB,,, | Performed by: STUDENT IN AN ORGANIZED HEALTH CARE EDUCATION/TRAINING PROGRAM

## 2021-06-18 PROCEDURE — 81001 URINALYSIS AUTO W/SCOPE: CPT | Performed by: STUDENT IN AN ORGANIZED HEALTH CARE EDUCATION/TRAINING PROGRAM

## 2021-06-18 PROCEDURE — 3008F PR BODY MASS INDEX (BMI) DOCUMENTED: ICD-10-PCS | Mod: CPTII,S$GLB,, | Performed by: STUDENT IN AN ORGANIZED HEALTH CARE EDUCATION/TRAINING PROGRAM

## 2021-06-18 PROCEDURE — 99214 OFFICE O/P EST MOD 30 MIN: CPT | Mod: 25,S$GLB,, | Performed by: STUDENT IN AN ORGANIZED HEALTH CARE EDUCATION/TRAINING PROGRAM

## 2021-06-18 PROCEDURE — 1125F PR PAIN SEVERITY QUANTIFIED, PAIN PRESENT: ICD-10-PCS | Mod: S$GLB,,, | Performed by: STUDENT IN AN ORGANIZED HEALTH CARE EDUCATION/TRAINING PROGRAM

## 2021-06-18 PROCEDURE — 81025 POCT URINE PREGNANCY: ICD-10-PCS | Mod: S$GLB,,, | Performed by: STUDENT IN AN ORGANIZED HEALTH CARE EDUCATION/TRAINING PROGRAM

## 2021-06-18 PROCEDURE — 87086 URINE CULTURE/COLONY COUNT: CPT | Performed by: STUDENT IN AN ORGANIZED HEALTH CARE EDUCATION/TRAINING PROGRAM

## 2021-06-18 PROCEDURE — 99214 PR OFFICE/OUTPT VISIT, EST, LEVL IV, 30-39 MIN: ICD-10-PCS | Mod: 25,S$GLB,, | Performed by: STUDENT IN AN ORGANIZED HEALTH CARE EDUCATION/TRAINING PROGRAM

## 2021-06-18 PROCEDURE — 1125F AMNT PAIN NOTED PAIN PRSNT: CPT | Mod: S$GLB,,, | Performed by: STUDENT IN AN ORGANIZED HEALTH CARE EDUCATION/TRAINING PROGRAM

## 2021-06-18 PROCEDURE — 99999 PR PBB SHADOW E&M-EST. PATIENT-LVL IV: CPT | Mod: PBBFAC,,, | Performed by: STUDENT IN AN ORGANIZED HEALTH CARE EDUCATION/TRAINING PROGRAM

## 2021-06-18 RX ORDER — METRONIDAZOLE 500 MG/1
500 TABLET ORAL EVERY 8 HOURS
Qty: 30 TABLET | Refills: 0 | Status: SHIPPED | OUTPATIENT
Start: 2021-06-18 | End: 2021-06-28

## 2021-06-18 RX ORDER — CIPROFLOXACIN 500 MG/1
500 TABLET ORAL EVERY 12 HOURS
Qty: 20 TABLET | Refills: 0 | Status: SHIPPED | OUTPATIENT
Start: 2021-06-18 | End: 2021-06-28

## 2021-06-20 LAB — BACTERIA UR CULT: NO GROWTH

## 2021-06-21 ENCOUNTER — TELEPHONE (OUTPATIENT)
Dept: PRIMARY CARE CLINIC | Facility: CLINIC | Age: 36
End: 2021-06-21

## 2021-06-25 DIAGNOSIS — J45.41 MODERATE PERSISTENT ASTHMA WITH EXACERBATION: ICD-10-CM

## 2021-06-25 RX ORDER — IPRATROPIUM BROMIDE AND ALBUTEROL SULFATE 2.5; .5 MG/3ML; MG/3ML
3 SOLUTION RESPIRATORY (INHALATION) EVERY 6 HOURS PRN
Qty: 90 ML | Refills: 5 | Status: SHIPPED | OUTPATIENT
Start: 2021-06-25 | End: 2023-05-11 | Stop reason: SDUPTHER

## 2021-06-25 RX ORDER — FLUTICASONE PROPIONATE AND SALMETEROL XINAFOATE 230; 21 UG/1; UG/1
2 AEROSOL, METERED RESPIRATORY (INHALATION) 2 TIMES DAILY
Qty: 3 INHALER | Refills: 5 | Status: SHIPPED | OUTPATIENT
Start: 2021-06-25 | End: 2023-05-11 | Stop reason: SDUPTHER

## 2023-05-11 DIAGNOSIS — J45.41 MODERATE PERSISTENT ASTHMA WITH EXACERBATION: ICD-10-CM

## 2023-05-11 RX ORDER — IPRATROPIUM BROMIDE AND ALBUTEROL SULFATE 2.5; .5 MG/3ML; MG/3ML
3 SOLUTION RESPIRATORY (INHALATION) EVERY 6 HOURS PRN
Qty: 90 ML | Refills: 0 | Status: SHIPPED | OUTPATIENT
Start: 2023-05-11 | End: 2023-08-30

## 2023-05-11 RX ORDER — FLUTICASONE PROPIONATE AND SALMETEROL XINAFOATE 230; 21 UG/1; UG/1
2 AEROSOL, METERED RESPIRATORY (INHALATION) 2 TIMES DAILY
Qty: 3 EACH | Refills: 0 | Status: SHIPPED | OUTPATIENT
Start: 2023-05-11 | End: 2024-01-24 | Stop reason: SDUPTHER

## 2023-05-11 RX ORDER — IPRATROPIUM BROMIDE AND ALBUTEROL SULFATE 2.5; .5 MG/3ML; MG/3ML
3 SOLUTION RESPIRATORY (INHALATION) EVERY 6 HOURS PRN
Qty: 90 ML | Refills: 5 | OUTPATIENT
Start: 2023-05-11

## 2023-05-11 RX ORDER — FLUTICASONE PROPIONATE AND SALMETEROL XINAFOATE 230; 21 UG/1; UG/1
2 AEROSOL, METERED RESPIRATORY (INHALATION) 2 TIMES DAILY
Qty: 3 EACH | Refills: 5 | OUTPATIENT
Start: 2023-05-11

## 2023-05-11 NOTE — TELEPHONE ENCOUNTER
Care Due:                  Date            Visit Type   Department     Provider  --------------------------------------------------------------------------------                                SAME DAY -                              ESTABLISHED   [facility]  Last Visit: 06-      PATIENT      PRIMARY CARE   Chin [place]  Next Visit: None Scheduled  None         None Found                                                            Last  Test          Frequency    Reason                     Performed    Due Date  --------------------------------------------------------------------------------    Office Visit  12 months..  fluticasone-salmeterol...  06- 06-    [facility] Embedded Care Due Messages. Reference number: [id number].   5/11/2023 8:22:46 AM CDT

## 2023-05-11 NOTE — TELEPHONE ENCOUNTER
Refill Decision Note   Jara Tetnikki  is requesting a refill authorization.  Brief Assessment and Rationale for Refill:  Quick Discontinue     Medication Therapy Plan:  Duplicate      Comments:     Note composed:12:47 PM 05/11/2023

## 2023-05-11 NOTE — TELEPHONE ENCOUNTER
No care due was identified.  [facility] Embedded Care Due Messages. Reference number: [id number].   5/11/2023 8:40:52 AM CDT

## 2023-05-11 NOTE — TELEPHONE ENCOUNTER
Call tell pt [facility] refill not seen since June 2021 almost 2 yrs Needs come in fasting possibly get lab get seen get additional refills

## 2023-05-11 NOTE — TELEPHONE ENCOUNTER
Refill Routing Note   Medication(s) are not appropriate for processing by [facility] for the following reason(s):      Patient not seen by PCP within 15 months  Patient seen in ED/Hospital since LOV with PCP    [facility] action(s):  Defer Appointment due          Appointments  past 12m or future 3m with PCP    Date Provider   Last Visit   2/10/2021 Speedy Cope MD   Next Visit   5/11/2023 Speedy Cope MD   ED visits in past 90 days: 0        Note composed:8:50 AM 05/11/2023

## 2023-06-07 RX ORDER — FLUTICASONE PROPIONATE AND SALMETEROL XINAFOATE 230; 21 UG/1; UG/1
AEROSOL, METERED RESPIRATORY (INHALATION)
Qty: 12 EACH | OUTPATIENT
Start: 2023-06-07

## 2023-06-07 NOTE — TELEPHONE ENCOUNTER
No care due was identified.  [facility] Embedded Care Due Messages. Reference number: [id number].   6/07/2023 2:24:14 AM CDT

## 2023-06-08 NOTE — TELEPHONE ENCOUNTER
Called patient in regards to medication request. Patient was informed that medication was denied. Patient schedule for an appointment on 06/13.

## 2023-08-12 PROBLEM — R11.2 NAUSEA AND VOMITING: Status: ACTIVE | Noted: 2023-08-12

## 2023-08-12 PROBLEM — R10.9 ABDOMINAL PAIN: Status: ACTIVE | Noted: 2023-08-12

## 2023-08-30 ENCOUNTER — OFFICE VISIT (OUTPATIENT)
Dept: PRIMARY CARE CLINIC | Facility: CLINIC | Age: 38
End: 2023-08-30
Payer: COMMERCIAL

## 2023-08-30 VITALS
BODY MASS INDEX: 27.58 KG/M2 | HEART RATE: 68 BPM | OXYGEN SATURATION: 98 % | WEIGHT: 146.06 LBS | RESPIRATION RATE: 18 BRPM | HEIGHT: 61 IN | DIASTOLIC BLOOD PRESSURE: 72 MMHG | SYSTOLIC BLOOD PRESSURE: 120 MMHG

## 2023-08-30 DIAGNOSIS — J40 BRONCHITIS: ICD-10-CM

## 2023-08-30 DIAGNOSIS — Z86.2 HISTORY OF ACQUIRED HEMOLYTIC ANEMIA: ICD-10-CM

## 2023-08-30 DIAGNOSIS — E03.8 SUBCLINICAL HYPOTHYROIDISM: ICD-10-CM

## 2023-08-30 DIAGNOSIS — F41.9 ANXIETY: ICD-10-CM

## 2023-08-30 DIAGNOSIS — J45.20 INTERMITTENT ASTHMA WITHOUT COMPLICATION, UNSPECIFIED ASTHMA SEVERITY: Primary | ICD-10-CM

## 2023-08-30 PROCEDURE — 3008F PR BODY MASS INDEX (BMI) DOCUMENTED: ICD-10-PCS | Mod: CPTII,S$GLB,, | Performed by: FAMILY MEDICINE

## 2023-08-30 PROCEDURE — 3078F DIAST BP <80 MM HG: CPT | Mod: CPTII,S$GLB,, | Performed by: FAMILY MEDICINE

## 2023-08-30 PROCEDURE — 3008F BODY MASS INDEX DOCD: CPT | Mod: CPTII,S$GLB,, | Performed by: FAMILY MEDICINE

## 2023-08-30 PROCEDURE — 3078F PR MOST RECENT DIASTOLIC BLOOD PRESSURE < 80 MM HG: ICD-10-PCS | Mod: CPTII,S$GLB,, | Performed by: FAMILY MEDICINE

## 2023-08-30 PROCEDURE — 3074F SYST BP LT 130 MM HG: CPT | Mod: CPTII,S$GLB,, | Performed by: FAMILY MEDICINE

## 2023-08-30 PROCEDURE — 99999 PR PBB SHADOW E&M-EST. PATIENT-LVL III: CPT | Mod: PBBFAC,,, | Performed by: FAMILY MEDICINE

## 2023-08-30 PROCEDURE — 99214 PR OFFICE/OUTPT VISIT, EST, LEVL IV, 30-39 MIN: ICD-10-PCS | Mod: S$GLB,,, | Performed by: FAMILY MEDICINE

## 2023-08-30 PROCEDURE — 99999 PR PBB SHADOW E&M-EST. PATIENT-LVL III: ICD-10-PCS | Mod: PBBFAC,,, | Performed by: FAMILY MEDICINE

## 2023-08-30 PROCEDURE — 99214 OFFICE O/P EST MOD 30 MIN: CPT | Mod: S$GLB,,, | Performed by: FAMILY MEDICINE

## 2023-08-30 PROCEDURE — 1159F MED LIST DOCD IN RCRD: CPT | Mod: CPTII,S$GLB,, | Performed by: FAMILY MEDICINE

## 2023-08-30 PROCEDURE — 3074F PR MOST RECENT SYSTOLIC BLOOD PRESSURE < 130 MM HG: ICD-10-PCS | Mod: CPTII,S$GLB,, | Performed by: FAMILY MEDICINE

## 2023-08-30 PROCEDURE — 1159F PR MEDICATION LIST DOCUMENTED IN MEDICAL RECORD: ICD-10-PCS | Mod: CPTII,S$GLB,, | Performed by: FAMILY MEDICINE

## 2023-08-30 RX ORDER — MONTELUKAST SODIUM 10 MG/1
10 TABLET ORAL NIGHTLY
Qty: 90 TABLET | Refills: 3 | Status: SHIPPED | OUTPATIENT
Start: 2023-08-30

## 2023-08-30 RX ORDER — ALBUTEROL SULFATE 0.83 MG/ML
SOLUTION RESPIRATORY (INHALATION)
Qty: 100 EACH | Refills: 5 | Status: SHIPPED | OUTPATIENT
Start: 2023-08-30

## 2023-08-30 RX ORDER — SODIUM CHLORIDE FOR INHALATION 0.9 %
3 VIAL, NEBULIZER (ML) INHALATION
Qty: 3 ML | Refills: 5 | Status: SHIPPED | OUTPATIENT
Start: 2023-08-30 | End: 2024-08-29

## 2023-08-30 RX ORDER — ALBUTEROL SULFATE 0.63 MG/3ML
0.63 SOLUTION RESPIRATORY (INHALATION) EVERY 6 HOURS PRN
Qty: 3 ML | Refills: 5 | Status: CANCELLED | OUTPATIENT
Start: 2023-08-30

## 2023-08-30 NOTE — PROGRESS NOTES
Subjective:       Patient ID: Marek Thompson is a 37 y.o. female.    Chief Complaint: Medication Refill    HPI:  37-year-old white female recently seen in the emergency room--08/12/2023-patient was having abdominal pain right upper quadrant under the right ribcage felt to possibly have gallbladder disease.  Did CT scan told was normal.  Told had a virus. Went home Thursday 08/19/2023 so was rushed to [facility]--same pain--did CT scan had a cyst on the right ovary 2.5 cm told to follow-up with ObGYN.  Saw OBGYN last week--did US found 2 cm endometriosis--left from hysterectomy -- next month to remove the endometrial tissue. Surg 9-.  Patient needs refills of Singulair and albuterol solution          ROS:  Skin: no psoriasis, eczema, skin cancer  HEENT: No headache, ocular pain, blurred vision, diplopia, epistaxis, hoarseness change in voice, thyroid trouble  Lung: No pneumonia, +asthma, no Tb, wheezing, SOB, no smoking needs refills of albuterol solution  Heart: No chest pain, ankle edema, palpitations, MI, +angy murmur, no hypertension, hyperlipidemia--no stent bypass or arrhythmia  Abdomen:  History of abdominal pain in the right upper quadrant under the ribcage had CT scan showing right ovarian cyst 2.5 cm pelvic ultrasound showing endometrial tissue 2.5 cm scheduled for surgery see history of present illness No nausea, vomiting, diarrhea, constipation, ulcers, hepatitis, gallbladder disease, melena, hematochezia, hematemesis  : no UTI, renal disease, stones  MS: no fractures, O/A, lupus, rheumatoid, gout  Neuro: No dizziness, LOC, seizures   No diabetes, + anemia, no anxiety, no depression    2 children [employer] lives  and 2 children    Objective:   Physical Exam:  General: Well nourished, well developed, no acute distress  Skin: No lesions  HEENT: Eyes PERRLA, EOM intact, nose patent, throat non-erythematous   NECK: Supple, no bruits, No JVD, no nodes  Lungs: Clear,  no rales, rhonchi, wheezing  Heart: Regular rate and rhythm, no murmurs, gallops, or rubs  Abdomen: flat, bowel sounds positive, no tenderness, or organomegaly  MS: Range of motion and muscle strength intact  Neuro: Alert, CN intact, oriented X 3  Extremities: No cyanosis, clubbing, or edema         Assessment:       1. Intermittent asthma without complication, unspecified asthma severity    2. Bronchitis    3. Anxiety    4. History of acquired hemolytic anemia    5. Subclinical hypothyroidism        Plan:       Intermittent asthma without complication, unspecified asthma severity    Bronchitis    Anxiety    History of acquired hemolytic anemia    Subclinical hypothyroidism

## 2023-09-18 ENCOUNTER — PATIENT MESSAGE (OUTPATIENT)
Dept: PRIMARY CARE CLINIC | Facility: CLINIC | Age: 38
End: 2023-09-18
Payer: COMMERCIAL

## 2023-09-22 PROBLEM — Z48.89 ENCOUNTER FOR POST SURGICAL WOUND CHECK: Status: ACTIVE | Noted: 2023-09-22

## 2023-10-18 ENCOUNTER — PATIENT MESSAGE (OUTPATIENT)
Dept: CARDIOLOGY | Facility: CLINIC | Age: 38
End: 2023-10-18
Payer: COMMERCIAL

## 2024-01-24 ENCOUNTER — OFFICE VISIT (OUTPATIENT)
Dept: PRIMARY CARE CLINIC | Facility: CLINIC | Age: 39
End: 2024-01-24
Payer: COMMERCIAL

## 2024-01-24 VITALS
BODY MASS INDEX: 28.12 KG/M2 | SYSTOLIC BLOOD PRESSURE: 124 MMHG | RESPIRATION RATE: 18 BRPM | WEIGHT: 148.94 LBS | OXYGEN SATURATION: 100 % | DIASTOLIC BLOOD PRESSURE: 78 MMHG | HEART RATE: 71 BPM | HEIGHT: 61 IN

## 2024-01-24 DIAGNOSIS — R01.1 HEART MURMUR: ICD-10-CM

## 2024-01-24 DIAGNOSIS — J45.20 MILD INTERMITTENT ASTHMA WITHOUT COMPLICATION: ICD-10-CM

## 2024-01-24 DIAGNOSIS — R42 DIZZINESS AND GIDDINESS: Primary | ICD-10-CM

## 2024-01-24 DIAGNOSIS — E06.3 HASHIMOTO'S THYROIDITIS: ICD-10-CM

## 2024-01-24 DIAGNOSIS — Z87.898 HX OF SYNCOPE: ICD-10-CM

## 2024-01-24 DIAGNOSIS — F41.9 ANXIETY: ICD-10-CM

## 2024-01-24 PROCEDURE — 99214 OFFICE O/P EST MOD 30 MIN: CPT | Mod: S$GLB,,, | Performed by: FAMILY MEDICINE

## 2024-01-24 PROCEDURE — 3074F SYST BP LT 130 MM HG: CPT | Mod: CPTII,S$GLB,, | Performed by: FAMILY MEDICINE

## 2024-01-24 PROCEDURE — 1159F MED LIST DOCD IN RCRD: CPT | Mod: CPTII,S$GLB,, | Performed by: FAMILY MEDICINE

## 2024-01-24 PROCEDURE — 3078F DIAST BP <80 MM HG: CPT | Mod: CPTII,S$GLB,, | Performed by: FAMILY MEDICINE

## 2024-01-24 PROCEDURE — 3008F BODY MASS INDEX DOCD: CPT | Mod: CPTII,S$GLB,, | Performed by: FAMILY MEDICINE

## 2024-01-24 PROCEDURE — 99999 PR PBB SHADOW E&M-EST. PATIENT-LVL III: CPT | Mod: PBBFAC,,, | Performed by: FAMILY MEDICINE

## 2024-01-24 RX ORDER — PREDNISONE 20 MG/1
20 TABLET ORAL DAILY
Qty: 7 TABLET | Refills: 0 | Status: SHIPPED | OUTPATIENT
Start: 2024-01-24 | End: 2024-01-31

## 2024-01-24 RX ORDER — AZITHROMYCIN 250 MG/1
TABLET, FILM COATED ORAL
Qty: 6 TABLET | Refills: 0 | Status: SHIPPED | OUTPATIENT
Start: 2024-01-24 | End: 2024-01-28

## 2024-01-24 RX ORDER — FLUTICASONE PROPIONATE AND SALMETEROL XINAFOATE 230; 21 UG/1; UG/1
AEROSOL, METERED RESPIRATORY (INHALATION)
Qty: 12 EACH | OUTPATIENT
Start: 2024-01-24

## 2024-01-24 RX ORDER — FLUTICASONE PROPIONATE AND SALMETEROL XINAFOATE 230; 21 UG/1; UG/1
2 AEROSOL, METERED RESPIRATORY (INHALATION) 2 TIMES DAILY
Qty: 3 EACH | Refills: 3 | Status: SHIPPED | OUTPATIENT
Start: 2024-01-24

## 2024-01-24 RX ORDER — MECLIZINE HYDROCHLORIDE 25 MG/1
TABLET ORAL
Qty: 30 TABLET | Refills: 2 | Status: SHIPPED | OUTPATIENT
Start: 2024-01-24

## 2024-01-24 NOTE — TELEPHONE ENCOUNTER
No care due was identified.  [facility] Embedded Care Due Messages. Reference number: [id number].   1/24/2024 1:34:42 PM CST

## 2024-01-24 NOTE — PROGRESS NOTES
Subjective:       Patient ID: Marek Thompson is a 38 y.o. female.    Chief Complaint: Dizziness (Started getting worse around naun )    HPI:  39 yo WF in for vertigo--on and off x 1 year --sporadically would occur---last 1 11/2 associated with N&V.  Was getting once every other month once every 2-3 months now getting every week  Had feels like weight is in it and patient feels off balance and nauseated all the time--no fever---no runny stuffy nose--no sore throat--no cough.  +asthma no wheezing--no history pneumonia tuberculosis no smoking.  Room is spinning-dizziness--aunt had meclizine she has vertigo --seems help but if episode occurs patient will vomit the medication.  No loss of consciousness no seizures. Hx when had urge for a bowel movement and had to go to the bathroom real badly--pt would gets severe cramps that were so bad patient would pass and fall off the toilet and hit her head--colon wqs attached to stomach lining --no episodes of the cramping or passing out since that time.  Hit head about 20 X past with episodes --hit head on tile floor.   Went to ENT last week --told ears were fine and crystals were fine . Tilt test was neg      ROS:  Skin: no psoriasis, eczema, skin cancer  HEENT: + headache heaviness always there ,no  ocular pain, blurred vision, diplopia, epistaxis, hoarseness change in voice, thyroid trouble  Lung: No pneumonia, +asthma, no Tb, wheezing, SOB, no smoking needs refills of albuterol solution  Heart: No chest pain, ankle edema, palpitations, MI, +angy murmur, no hypertension, hyperlipidemia--no stent bypass or arrhythmia  Abdomen:  No nausea, vomiting, diarrhea, constipation, ulcers, hepatitis, gallbladder disease, melena, hematochezia, hematemesis--did have an ovarian cyst/endometriosis/scarring between the colon and lining of the stomach see history of present illness  : no UTI, renal disease, stones  GYN hyst 8 1/2 yrs ago   MS: no fractures, O/A, lupus, rheumatoid,  gout  Neuro: + dizziness,+ LOC, no seizures   No diabetes, + anemia, no anxiety, no depression    2 children [employer] lives  and 2 children    Objective:   Physical Exam:  General: Well nourished, well developed, no acute distress  Skin: No lesions  HEENT: Eyes PERRLA, EOM intact, nose patent, throat non-erythematous   NECK: Supple, no bruits, No JVD, no nodes  Lungs: Clear, no rales, rhonchi, wheezing  Heart: Regular rate and rhythm, no murmurs, gallops, or rubs  Abdomen: flat, bowel sounds positive, no tenderness, or organomegaly  MS: Range of motion and muscle strength intact  Neuro: Alert, CN intact, oriented X 3  Extremities: No cyanosis, clubbing, or edema         Assessment:       1. Dizziness and giddiness    2. Anxiety    3. Mild intermittent asthma without complication    4. Hashimoto's thyroiditis    5. Heart murmur    6. Hx of syncope          Plan:       Dizziness and giddiness  -     CBC Auto Differential; Future; Expected date: 01/24/2024  -     Comprehensive Metabolic Panel; Future; Expected date: 01/24/2024  -     MRI Brain W WO Contrast; Future; Expected date: 01/24/2024    Anxiety    Mild intermittent asthma without complication    Hashimoto's thyroiditis    Heart murmur    Hx of syncope    Other orders  -     azithromycin (Z-CHRISTIANE) 250 MG tablet; 2 tabs by mouth day 1, then 1 tab by mouth daily x 4 days  Dispense: 6 tablet; Refill: 0  -     predniSONE (DELTASONE) 20 MG tablet; Take 1 tablet (20 mg total) by mouth once daily. for 7 days  Dispense: 7 tablet; Refill: 0  -     meclizine (ANTIVERT) 25 mg tablet; 1 po q 8 hrs prn dizziness  Dispense: 30 tablet; Refill: 2          History of vertigo x1 year--initially occurred every of the month or every 3rd month now occurring every week---last 1-1-1/2 hours--associated with nausea vomiting--tried meclizine minimal relief--saw ENT told inner ear normal no crystals did tilt test--patient feels a heaviness in her head--has a history of  severe abdominal cramps in the past where she would pass out and hit her head on a tile floor occurred about 20 times--had recent surgery where colon was stuck on the stomach lining once this surgery was done where that was released has not had any symptoms of severe cramping with syncope since--but has vertigo  Treatment when Zithromax/prednisone 20 mg q.d. x7 days/Claritin q.d./Antivert 25 mg q.8 hours p.r.n. dizziness--MRI the brain due to recurrent contusion so the skull with passing out--if dizziness stays get carotid ultrasound echocardiogram 24 Holter--redo CBCs CMP

## 2024-01-24 NOTE — TELEPHONE ENCOUNTER
Refill Decision Note   Jara Donna  is requesting a refill authorization.  Brief Assessment and Rationale for Refill:  Approve     Medication Therapy Plan:         Comments:     Note composed:3:30 PM 01/24/2024

## 2024-01-24 NOTE — TELEPHONE ENCOUNTER
No care due was identified.  [facility] Embedded Care Due Messages. Reference number: [id number].   1/24/2024 3:39:13 PM CST

## 2024-01-24 NOTE — TELEPHONE ENCOUNTER
Refill request for      fluticasone-salmeterol 230-21 mcg/dose (ADVAIR HFA) 230-21 mcg/actuation HFAA inhaler

## 2024-01-25 NOTE — TELEPHONE ENCOUNTER
Refill Decision Note   Marek Thompson  is requesting a refill authorization.  Brief Assessment and Rationale for Refill:  Quick Discontinue     Medication Therapy Plan:  Receipt confirmed by pharmacy (1/24/2024  3:31 PM CST)      Comments:     Note composed:6:36 PM 01/24/2024

## 2024-02-12 ENCOUNTER — OFFICE VISIT (OUTPATIENT)
Dept: PRIMARY CARE CLINIC | Facility: CLINIC | Age: 39
End: 2024-02-12
Payer: COMMERCIAL

## 2024-02-12 VITALS
HEIGHT: 61 IN | RESPIRATION RATE: 18 BRPM | WEIGHT: 150.13 LBS | HEART RATE: 60 BPM | SYSTOLIC BLOOD PRESSURE: 130 MMHG | BODY MASS INDEX: 28.35 KG/M2 | OXYGEN SATURATION: 99 % | DIASTOLIC BLOOD PRESSURE: 60 MMHG

## 2024-02-12 DIAGNOSIS — E06.3 HASHIMOTO'S THYROIDITIS: ICD-10-CM

## 2024-02-12 DIAGNOSIS — Z86.2 HISTORY OF ACQUIRED HEMOLYTIC ANEMIA: ICD-10-CM

## 2024-02-12 DIAGNOSIS — Z23 NEED FOR PNEUMOCOCCAL VACCINATION: Primary | ICD-10-CM

## 2024-02-12 DIAGNOSIS — Z87.898 HX OF SYNCOPE: ICD-10-CM

## 2024-02-12 DIAGNOSIS — J45.20 MILD INTERMITTENT ASTHMA WITHOUT COMPLICATION: ICD-10-CM

## 2024-02-12 DIAGNOSIS — F41.9 ANXIETY: ICD-10-CM

## 2024-02-12 DIAGNOSIS — R68.89 FULLNESS IN HEAD: ICD-10-CM

## 2024-02-12 PROCEDURE — 3078F DIAST BP <80 MM HG: CPT | Mod: CPTII,S$GLB,, | Performed by: FAMILY MEDICINE

## 2024-02-12 PROCEDURE — 3075F SYST BP GE 130 - 139MM HG: CPT | Mod: CPTII,S$GLB,, | Performed by: FAMILY MEDICINE

## 2024-02-12 PROCEDURE — 90677 PCV20 VACCINE IM: CPT | Mod: S$GLB,,, | Performed by: FAMILY MEDICINE

## 2024-02-12 PROCEDURE — 3008F BODY MASS INDEX DOCD: CPT | Mod: CPTII,S$GLB,, | Performed by: FAMILY MEDICINE

## 2024-02-12 PROCEDURE — 90471 IMMUNIZATION ADMIN: CPT | Mod: S$GLB,,, | Performed by: FAMILY MEDICINE

## 2024-02-12 PROCEDURE — 99214 OFFICE O/P EST MOD 30 MIN: CPT | Mod: 25,S$GLB,, | Performed by: FAMILY MEDICINE

## 2024-02-12 PROCEDURE — 99999 PR PBB SHADOW E&M-EST. PATIENT-LVL IV: CPT | Mod: PBBFAC,,, | Performed by: FAMILY MEDICINE

## 2024-02-12 PROCEDURE — 1159F MED LIST DOCD IN RCRD: CPT | Mod: CPTII,S$GLB,, | Performed by: FAMILY MEDICINE

## 2024-02-12 NOTE — PROGRESS NOTES
Subjective:       Patient ID: Marek Thompson is a 38 y.o. female.    Chief Complaint: Follow-up (2 week )    HPI: 39 yo WF in for follow up --pt txed with zith --pred--claritan --no more dizziness but still with heaviness in the head--had MRI that was normal  See history of hitting her head numerous times on the floor about 20 X was having pain --cramps and pain so bad when pass out--did surgery --exploratory surgery found that the colon was attached the stomach lining when this was resected patient's symptoms markedly improved but the heaviness in the had persisted   No dizziness passing out or seizures       Office visit 01/24/2024 39 yo WF in for vertigo--on and off x 1 year --sporadically would occur---last 1 11/2 associated with N&V.  Was getting once every other month once every 2-3 months now getting every week  Had feels like weight is in it and patient feels off balance and nauseated all the time--no fever---no runny stuffy nose--no sore throat--no cough.  +asthma no wheezing--no history pneumonia tuberculosis no smoking.  Room is spinning-dizziness--aunt had meclizine she has vertigo --seems help but if episode occurs patient will vomit the medication.  No loss of consciousness no seizures. Hx when had urge for a bowel movement and had to go to the bathroom real badly--pt would gets severe cramps that were so bad patient would pass and fall off the toilet and hit her head--colon wqs attached to stomach lining --no episodes of the cramping or passing out since that time.  Hit head about 20 X past with episodes --hit head on tile floor.   Went to ENT last week --told ears were fine and crystals were fine . Tilt test was neg      ROS:  Skin: no psoriasis, eczema, skin cancer  HEENT: + headache heaviness always there ,no  ocular pain, blurred vision, diplopia, epistaxis, hoarseness change in voice, thyroid trouble  Lung: No pneumonia, +asthma, no Tb, wheezing, SOB, no smoking needs refills of albuterol  solution  Heart: No chest pain, ankle edema, palpitations, MI, +angy murmur, no hypertension, hyperlipidemia--no stent bypass or arrhythmia  Abdomen:  No nausea, vomiting, diarrhea, constipation, ulcers, hepatitis, gallbladder disease, melena, hematochezia, hematemesis--did have an ovarian cyst/endometriosis/scarring between the colon and lining of the stomach see history of present illness  : no UTI, renal disease, stones  GYN hyst 8 1/2 yrs ago   MS: no fractures, O/A, lupus, rheumatoid, gout  Neuro: + dizziness,+ LOC, no seizures   No diabetes, + anemia, no anxiety, no depression    2 children [employer] lives  and 2 children    Objective:   Physical Exam:  General: Well nourished, well developed, no acute distress  Skin: No lesions  HEENT: Eyes PERRLA, EOM intact, nose patent, throat non-erythematous   NECK: Supple, no bruits, No JVD, no nodes  Lungs: Clear, no rales, rhonchi, wheezing  Heart: Regular rate and rhythm, no murmurs, gallops, or rubs  Abdomen: flat, bowel sounds positive, no tenderness, or organomegaly  MS: Range of motion and muscle strength intact  Neuro: Alert, CN intact, oriented X 3  Extremities: No cyanosis, clubbing, or edema         Assessment:       1. Need for pneumococcal vaccination    2. Fullness in head    3. Hx of syncope    4. Hashimoto's thyroiditis    5. History of acquired hemolytic anemia    6. Mild intermittent asthma without complication    7. Anxiety          Plan:       Need for pneumococcal vaccination  -     (In Office Administered) Pneumococcal Conjugate Vaccine (20 Valent) (IM) (Preferred)    Fullness in head  -     Echo  -     Holter monitor - 24 hour; Future  -     US Carotid Bilateral; Future; Expected date: 02/12/2024  -     Ambulatory referral/consult to Neurology; Future; Expected date: 02/19/2024    Hx of syncope    Hashimoto's thyroiditis    History of acquired hemolytic anemia    Mild intermittent asthma without complication    Anxiety  -      Hemoglobin A1C; Future; Expected date: 02/12/2024          History of vertigo x1 year--initially occurred every of the month or every 3rd month now occurring every week---last 1-1-1/2 hours--associated with nausea vomiting--tried meclizine minimal relief--saw ENT told inner ear normal no crystals did tilt test--patient feels a heaviness in her head--has a history of severe abdominal cramps in the past where she would pass out and hit her head on a tile floor occurred about 20 times--had recent surgery where colon was stuck on the stomach lining once this surgery was done where that was released has not had any symptoms of severe cramping with syncope since--but has vertigo  Treated Zithromax prednisone Claritin dizziness or vertigo has resolved but the heaviness in the head persist  MRI the brain was normal  For completeness will do carotid ultrasound 24 hour Holter echocardiogram and neurology consult--heaviness in the head appears to be benign but will have neurologist evaluate after workup complete patient also had CBCs CMP that were normal  Return 6 months

## 2024-02-12 NOTE — PROGRESS NOTES
Verified pt ID using name and . andrews Corrigan. Administered prevnar in left deltoid per physician order using aseptic technique. Aspirated and no blood return noted. Pt tolerated well with no adverse reactions noted.

## 2024-05-20 ENCOUNTER — TELEPHONE (OUTPATIENT)
Dept: NEUROLOGY | Facility: CLINIC | Age: 39
End: 2024-05-20
Payer: COMMERCIAL

## 2024-05-20 NOTE — TELEPHONE ENCOUNTER
Left voicemail for patient, Dr Arteaga will be out of office on 5/28/24.  Asked for a return call to switch appointment to a virtual or to reschedule. Call back number ([phone]) supplied on [employer].

## 2024-10-24 DIAGNOSIS — Z12.31 OTHER SCREENING MAMMOGRAM: ICD-10-CM

## 2025-05-07 ENCOUNTER — OFFICE VISIT (OUTPATIENT)
Dept: PRIMARY CARE CLINIC | Facility: CLINIC | Age: 40
End: 2025-05-07
Payer: COMMERCIAL

## 2025-05-07 VITALS
SYSTOLIC BLOOD PRESSURE: 120 MMHG | OXYGEN SATURATION: 98 % | HEIGHT: 61 IN | RESPIRATION RATE: 16 BRPM | DIASTOLIC BLOOD PRESSURE: 76 MMHG | BODY MASS INDEX: 30.41 KG/M2 | HEART RATE: 65 BPM | WEIGHT: 161.06 LBS

## 2025-05-07 DIAGNOSIS — R11.2 NAUSEA AND VOMITING, UNSPECIFIED VOMITING TYPE: ICD-10-CM

## 2025-05-07 DIAGNOSIS — Z12.31 ENCOUNTER FOR SCREENING MAMMOGRAM FOR MALIGNANT NEOPLASM OF BREAST: ICD-10-CM

## 2025-05-07 DIAGNOSIS — Z86.2 HISTORY OF ACQUIRED HEMOLYTIC ANEMIA: ICD-10-CM

## 2025-05-07 DIAGNOSIS — J45.20 MILD INTERMITTENT ASTHMA WITHOUT COMPLICATION: ICD-10-CM

## 2025-05-07 DIAGNOSIS — R42 VERTIGO: Primary | ICD-10-CM

## 2025-05-07 DIAGNOSIS — J01.00 ACUTE NON-RECURRENT MAXILLARY SINUSITIS: ICD-10-CM

## 2025-05-07 PROCEDURE — 99999 PR PBB SHADOW E&M-EST. PATIENT-LVL III: CPT | Mod: PBBFAC,,, | Performed by: FAMILY MEDICINE

## 2025-05-07 RX ORDER — MECLIZINE HYDROCHLORIDE 25 MG/1
TABLET ORAL
Qty: 60 TABLET | Refills: 5 | Status: SHIPPED | OUTPATIENT
Start: 2025-05-07

## 2025-05-07 RX ORDER — AZITHROMYCIN 250 MG/1
TABLET, FILM COATED ORAL
Qty: 6 TABLET | Refills: 0 | Status: SHIPPED | OUTPATIENT
Start: 2025-05-07 | End: 2025-05-11

## 2025-05-07 RX ORDER — ONDANSETRON 4 MG/1
8 TABLET, ORALLY DISINTEGRATING ORAL EVERY 6 HOURS PRN
Qty: 30 TABLET | Refills: 5 | Status: SHIPPED | OUTPATIENT
Start: 2025-05-07

## 2025-05-07 RX ORDER — PREDNISONE 20 MG/1
20 TABLET ORAL DAILY
Qty: 10 TABLET | Refills: 0 | Status: SHIPPED | OUTPATIENT
Start: 2025-05-07

## 2025-05-07 RX ORDER — LORATADINE 10 MG/1
10 TABLET ORAL DAILY
Qty: 30 TABLET | Refills: 5 | Status: SHIPPED | OUTPATIENT
Start: 2025-05-07 | End: 2026-05-07

## 2025-05-07 NOTE — PROGRESS NOTES
Subjective:       Patient ID: Marek Thompson is a 39 y.o. female.    Chief Complaint: Follow-up (Vertigo twice daily times 2 weeks), Emesis, and Fatigue    HPI: 38 yo WF --vertigo--twice daily x2 weeks--always vomits with it--had feels heavy and fatigue--MRI of the brain was normal January 2024--chest x-ray EKG July 2024 within normal limits  No fevers---no runny stuffy nose--no sore throat---no cough.   Has had problems on and off for the past 2 years---initially had some relief with Antivert for different medications for sinus--but now is having problems twice a day--patient will be at work--not associated with a specific time or specific activity.  Saw ENT in the past told had fine crystals had a tilt test that was negative  Does dizzy exercises Never saw neurologist .       ROS:  Skin: no psoriasis, eczema, skin cancer  HEENT: + headache heaviness always there ,no  ocular pain, blurred vision, diplopia, epistaxis, hoarseness change in voice, thyroid trouble  Lung: No pneumonia, +asthma, no Tb, wheezing, SOB, no smoking needs refills of albuterol solution  Heart: No chest pain, ankle edema, palpitations, MI, +angy murmur, no hypertension, hyperlipidemia--no stent bypass or arrhythmia  Abdomen:  No nausea, vomiting, diarrhea, constipation, ulcers, hepatitis, gallbladder disease, melena, hematochezia, hematemesis--did have an ovarian cyst/endometriosis/scarring between the colon and lining of the stomach see history of present illness  : no UTI, renal disease, stones  GYN hyst 8 1/2 yrs ago   MS: no fractures, O/A, lupus, rheumatoid, gout  Neuro: + dizziness,+ LOC, no seizures   No diabetes, + anemia, no anxiety, no depression    2 children [employer] lives  and 2 children    Objective:   Physical Exam:  General: Well nourished, well developed, no acute distress  Skin: No lesions  HEENT: Eyes PERRLA, EOM intact, nose patent, throat +1/4 erythematous ears some greenish to the right tympanic  membrane but overall no significant findings  NECK: Supple, no bruits, No JVD, no nodes  Lungs: Clear, no rales, rhonchi, wheezing  Heart: Regular rate and rhythm, no murmurs, gallops, or rubs  Abdomen: flat, bowel sounds positive, no tenderness, or organomegaly  MS: Range of motion and muscle strength intact  Neuro: Alert, CN intact, oriented X 3  Extremities: No cyanosis, clubbing, or edema         Assessment:       1. Vertigo    2. Encounter for screening mammogram for malignant neoplasm of breast    3. Nausea and vomiting, unspecified vomiting type    4. Acute non-recurrent maxillary sinusitis    5. History of acquired hemolytic anemia    6. Mild intermittent asthma without complication          Plan:       Vertigo  -     CBC Auto Differential; Future; Expected date: 05/07/2025  -     Comprehensive Metabolic Panel; Future; Expected date: 05/07/2025  -     Lipid Panel; Future; Expected date: 05/07/2025  -     Hemoglobin A1C; Future; Expected date: 05/07/2025  -     Rheumatoid Factor; Future; Expected date: 05/07/2025  -     Sedimentation rate; Future; Expected date: 05/07/2025  -     DANE Screen w/Reflex; Future; Expected date: 05/07/2025  -     TSH; Future; Expected date: 05/07/2025  -     T4, Free; Future; Expected date: 05/07/2025  -     Magnesium; Future; Expected date: 05/07/2025  -     Heterophile Ab Screen; Future; Expected date: 05/07/2025  -     Syphilis: Treponemal Antibodies, Chemiluminescence Immunoassay; Future; Expected date: 05/07/2025  -     POCT TB Skin Test Read    Encounter for screening mammogram for malignant neoplasm of breast  -     Mammo Digital Screening Bilat w/ Joe (XPD); Future; Expected date: 05/07/2025    Nausea and vomiting, unspecified vomiting type    Acute non-recurrent maxillary sinusitis    History of acquired hemolytic anemia    Mild intermittent asthma without complication    Other orders  -     azithromycin (Z-CHRISTIANE) 250 MG tablet; 2 tabs by mouth day 1, then 1 tab by mouth  daily x 4 days  Dispense: 6 tablet; Refill: 0  -     predniSONE (DELTASONE) 20 MG tablet; Take 1 tablet (20 mg total) by mouth once daily.  Dispense: 10 tablet; Refill: 0  -     loratadine (CLARITIN) 10 mg tablet; Take 1 tablet (10 mg total) by mouth once daily.  Dispense: 30 tablet; Refill: 5  -     meclizine (ANTIVERT) 25 mg tablet; 1 po q 6 hrs prn dizziness  Dispense: 60 tablet; Refill: 5  -     tuberculin injection 5 Units  -     ondansetron (ZOFRAN-ODT) 4 MG TbDL; Take 2 tablets (8 mg total) by mouth every 6 (six) hours as needed.  Dispense: 30 tablet; Refill: 5          History of vertigo x 2 yrs --initially treated with Antivert antibiotics steroids--did fairly well--was occurring about once a month--now a curling daily for the last 2 weeks---physical exam some dullness the right tympanic membrane some pressure in the right ear---will treat with Zithromax/prednisone 20 mg q.d. times 10 days Claritin 1 p.o. q.d. gee 25 q.6 for dizziness Zofran for nausea vomiting-  Due to recent episodes associated with nausea vomiting at least twice a day will redo workup with lab CBCs CMP lipids T4 TSH sed rate DANE rheumatoid factor treponemal pallidum test magnesium Monospot TB skin test---had MRI July 2024 chest x-ray EKG July 2024  Hypokalemia 3.0 Last lab tests  Hyperglycemia Last lab 140 patient interested in semaglutide  History of anemia but mi 36.1  Needs to do dizzy exercises  MRI the brain was normal  Consider For completeness if symptoms stay ENT consult then neurology consult other test  carotid ultrasound 24 hour Holter echocardiogram doubt needs repeat MRI of the brain and neurology consult--heaviness in the head appears to be benign but will have neurologist evaluate after workup complete   Return 6 weeks

## 2025-05-07 NOTE — PROGRESS NOTES
Verified pt ID using name and . NKDA. Administered PPD skin test in left forearm per physician order using aseptic technique. Aspirated and no blood return noted. Pt tolerated well with no adverse reactions noted.

## 2025-05-09 ENCOUNTER — CLINICAL SUPPORT (OUTPATIENT)
Dept: PRIMARY CARE CLINIC | Facility: CLINIC | Age: 40
End: 2025-05-09
Payer: COMMERCIAL

## 2025-05-09 DIAGNOSIS — Z11.1 VISIT FOR TB SKIN TEST: Primary | ICD-10-CM

## 2025-05-09 LAB
TB INDURATION 48 - 72 HR READ: 0 MM
TB SKIN TEST 48 - 72 HR READ: NEGATIVE

## 2025-05-12 ENCOUNTER — TELEPHONE (OUTPATIENT)
Dept: PRIMARY CARE CLINIC | Facility: CLINIC | Age: 40
End: 2025-05-12
Payer: COMMERCIAL

## 2025-05-12 ENCOUNTER — PATIENT MESSAGE (OUTPATIENT)
Dept: PRIMARY CARE CLINIC | Facility: CLINIC | Age: 40
End: 2025-05-12
Payer: COMMERCIAL

## 2025-05-12 DIAGNOSIS — R76.8 POSITIVE ANA (ANTINUCLEAR ANTIBODY): Primary | ICD-10-CM

## 2025-05-12 NOTE — TELEPHONE ENCOUNTER
----- Message from Speedy Cope MD sent at 5/9/2025  4:17 PM CDT -----  Call tell pt lab DANE posive --DANE titer 1:160 speckled pattern DANE profile pending Needs see rheumatologist   ----- Message -----  From: Lab, Background User  Sent: 5/8/2025   6:41 AM CDT  To: Speedy Cope MD

## 2025-05-20 ENCOUNTER — OFFICE VISIT (OUTPATIENT)
Dept: RHEUMATOLOGY | Facility: CLINIC | Age: 40
End: 2025-05-20
Payer: COMMERCIAL

## 2025-05-20 VITALS
DIASTOLIC BLOOD PRESSURE: 75 MMHG | BODY MASS INDEX: 31.82 KG/M2 | SYSTOLIC BLOOD PRESSURE: 114 MMHG | HEART RATE: 69 BPM | WEIGHT: 168.44 LBS

## 2025-05-20 DIAGNOSIS — R42 VERTIGO: Primary | ICD-10-CM

## 2025-05-20 DIAGNOSIS — R63.5 ABNORMAL WEIGHT GAIN: ICD-10-CM

## 2025-05-20 DIAGNOSIS — R76.8 POSITIVE ANA (ANTINUCLEAR ANTIBODY): ICD-10-CM

## 2025-05-20 PROCEDURE — 3008F BODY MASS INDEX DOCD: CPT | Mod: CPTII,S$GLB,, | Performed by: INTERNAL MEDICINE

## 2025-05-20 PROCEDURE — 1159F MED LIST DOCD IN RCRD: CPT | Mod: CPTII,S$GLB,, | Performed by: INTERNAL MEDICINE

## 2025-05-20 PROCEDURE — 3044F HG A1C LEVEL LT 7.0%: CPT | Mod: CPTII,S$GLB,, | Performed by: INTERNAL MEDICINE

## 2025-05-20 PROCEDURE — 99204 OFFICE O/P NEW MOD 45 MIN: CPT | Mod: S$GLB,,, | Performed by: INTERNAL MEDICINE

## 2025-05-20 PROCEDURE — 99999 PR PBB SHADOW E&M-EST. PATIENT-LVL IV: CPT | Mod: PBBFAC,,, | Performed by: INTERNAL MEDICINE

## 2025-05-20 PROCEDURE — 3078F DIAST BP <80 MM HG: CPT | Mod: CPTII,S$GLB,, | Performed by: INTERNAL MEDICINE

## 2025-05-20 PROCEDURE — 3074F SYST BP LT 130 MM HG: CPT | Mod: CPTII,S$GLB,, | Performed by: INTERNAL MEDICINE

## 2025-05-20 NOTE — PROGRESS NOTES
Subjective:      Patient ID: Marek Thompson is a 39 y.o. female.    Chief Complaint: Establish Care    HPI  39 year old F with PMH of asthma here for evaluation of +DANE. Reports about 2 years ago, she started to have vertigo episodes.  Then several weeks ago,she started to have spells twice a week. She has to take meclizine every 6 hours to prevent the vertigo.  Denies numbness or tingling.  Denies any rashes.  Denies oral ulcers. Reports hair loss over the last 6 months.    Denies any Raynauds.  Reports night sweats but she had hysterectomy in 2017 due to heavy periods.    Reports she had 1 miscarriage about 7 weeks. She had 2 healthy pregnancies.  Denies joint pain or swelling.      Past Medical History:   Diagnosis Date    Anemia     Asthma        Review of Systems see HPI    Objective:   /75 (Patient Position: Sitting)   Pulse 69   Wt 76.4 kg (168 lb 6.9 oz)   BMI 31.82 kg/m²   Physical Exam   Constitutional: She is oriented to person, place, and time.   HENT:   Head: Normocephalic and atraumatic.   Right Ear: External ear normal.   Left Ear: External ear normal.   Nose: Nose normal.   Mouth/Throat: Oropharynx is clear and moist. No oropharyngeal exudate.   Eyes: Pupils are equal, round, and reactive to light. Conjunctivae are normal. Right eye exhibits no discharge. Left eye exhibits no discharge. No scleral icterus.   Neck: No JVD present. No thyromegaly present.   Cardiovascular: Normal rate, regular rhythm and normal heart sounds. Exam reveals no gallop and no friction rub.   No murmur heard.  Pulmonary/Chest: Effort normal and breath sounds normal. No respiratory distress. She has no wheezes. She has no rales. She exhibits no tenderness.   Abdominal: Soft. Bowel sounds are normal. She exhibits no distension and no mass. There is no abdominal tenderness. There is no rebound and no guarding.   Musculoskeletal:         General: No swelling, tenderness or deformity.      Cervical back: Neck supple.    Lymphadenopathy:     She has no cervical adenopathy.   Neurological: She is alert and oriented to person, place, and time. No cranial nerve deficit. Gait normal. Coordination normal.   Skin: Skin is dry. No rash noted. No erythema. No pallor.   Psychiatric: Affect and judgment normal.       No data to display     Assessment:   39 year old F with PMH of asthma here for evaluation of +DANE.    #+DANE: patient with +DANE and hair loss. DANE profile is negative.  Discussed with patient no evidence of SLE or connective tissue disease at this time.  Will run additional labs.  Labs    # Vertigo-patient has vertigo of unclear etiology.  Neuro    # Abnormal TSH- given hair loss and abnormal weight gain, recommend endo consult.  Labs  Endo consult    1. Positive DANE (antinuclear antibody)          Plan:     Problem List Items Addressed This Visit    None  Visit Diagnoses         Positive DANE (antinuclear antibody)              45 * minutes of total time spent on the encounter, which includes face to face time and non-face to face time preparing to see the patient (eg, review of tests), Obtaining and/or reviewing separately obtained history, Documenting clinical information in the electronic or other health record, Independently interpreting results (not separately reported) and communicating results to the patient/family/caregiver, or Care coordination (not separately reported).

## 2025-05-24 PROCEDURE — 84156 ASSAY OF PROTEIN URINE: CPT | Performed by: INTERNAL MEDICINE

## 2025-05-25 ENCOUNTER — RESULTS FOLLOW-UP (OUTPATIENT)
Dept: RHEUMATOLOGY | Facility: HOSPITAL | Age: 40
End: 2025-05-25

## 2025-06-04 ENCOUNTER — OFFICE VISIT (OUTPATIENT)
Dept: ENDOCRINOLOGY | Facility: CLINIC | Age: 40
End: 2025-06-04
Payer: COMMERCIAL

## 2025-06-04 DIAGNOSIS — E06.3 HASHIMOTO'S THYROIDITIS: Primary | ICD-10-CM

## 2025-06-04 DIAGNOSIS — E03.8 SUBCLINICAL HYPOTHYROIDISM: ICD-10-CM

## 2025-06-04 DIAGNOSIS — R63.5 ABNORMAL WEIGHT GAIN: ICD-10-CM

## 2025-06-04 DIAGNOSIS — R53.83 LETHARGY: ICD-10-CM

## 2025-06-04 PROCEDURE — G2211 COMPLEX E/M VISIT ADD ON: HCPCS | Mod: 95,,, | Performed by: STUDENT IN AN ORGANIZED HEALTH CARE EDUCATION/TRAINING PROGRAM

## 2025-06-04 PROCEDURE — 98002 SYNCH AUDIO-VIDEO NEW MOD 45: CPT | Mod: 95,,, | Performed by: STUDENT IN AN ORGANIZED HEALTH CARE EDUCATION/TRAINING PROGRAM

## 2025-06-04 PROCEDURE — 3044F HG A1C LEVEL LT 7.0%: CPT | Mod: CPTII,95,, | Performed by: STUDENT IN AN ORGANIZED HEALTH CARE EDUCATION/TRAINING PROGRAM

## 2025-06-04 PROCEDURE — 1159F MED LIST DOCD IN RCRD: CPT | Mod: CPTII,95,, | Performed by: STUDENT IN AN ORGANIZED HEALTH CARE EDUCATION/TRAINING PROGRAM

## 2025-06-04 RX ORDER — LEVOTHYROXINE SODIUM 75 UG/1
75 TABLET ORAL
Qty: 30 TABLET | Refills: 11 | Status: SHIPPED | OUTPATIENT
Start: 2025-06-04 | End: 2026-06-04

## 2025-06-10 ENCOUNTER — RESULTS FOLLOW-UP (OUTPATIENT)
Dept: ENDOCRINOLOGY | Facility: CLINIC | Age: 40
End: 2025-06-10

## 2025-06-10 ENCOUNTER — PATIENT MESSAGE (OUTPATIENT)
Dept: ENDOCRINOLOGY | Facility: CLINIC | Age: 40
End: 2025-06-10
Payer: COMMERCIAL

## 2025-06-10 DIAGNOSIS — E06.3 HASHIMOTO'S THYROIDITIS: ICD-10-CM

## 2025-06-10 DIAGNOSIS — E03.8 SUBCLINICAL HYPOTHYROIDISM: ICD-10-CM

## 2025-06-19 DIAGNOSIS — E06.3 HASHIMOTO'S THYROIDITIS: ICD-10-CM

## 2025-06-19 DIAGNOSIS — E03.8 SUBCLINICAL HYPOTHYROIDISM: ICD-10-CM

## 2025-06-19 RX ORDER — LEVOTHYROXINE SODIUM 75 UG/1
75 TABLET ORAL
Qty: 30 TABLET | Refills: 11 | OUTPATIENT
Start: 2025-06-19 | End: 2026-06-19

## 2025-06-20 ENCOUNTER — PATIENT OUTREACH (OUTPATIENT)
Dept: ADMINISTRATIVE | Facility: HOSPITAL | Age: 40
End: 2025-06-20
Payer: COMMERCIAL

## 2025-06-20 NOTE — PROGRESS NOTES
Care Coordination Encounter Details:       MyChart Portal Status:         [x]  Reviewed MyChart Portal Status offered / enrolled if applicable        Additional Notes:     MyChart Outcomes: Pt is enrolled & active          Updates Requested / Reviewed:        Updated Care Coordination Note, Care Everywhere, , and Immunizations Reconciliation Completed or Queried: [place]         Health Maintenance Screening(s) Due:      Health Maintenance Topics Overdue:      VBHM Score: 1     Mammogram                 Health Maintenance Topic(s) Outreach Outcomes & Actions Taken:    Breast Cancer Screening - Outreach Outcomes & Actions Taken  : Portal message sent in regards to scheduling     Primary Care Appt - Outreach Outcomes & Actions Taken  : Primary Care Appt Scheduled. Patient has an appointment scheduled with PCP on 6/23/2025      Chronic Disease Management:     Diabetes Measures        Lab Results   Component Value Date    HGBA1C 5.0 05/08/2025           [x]  Reviewed chart for active Diabetes diagnosis     []  Scheduled necessary follow up appointments if needed         Additional Notes:  Patient is not diabetic            Hypertension Measures        BP Readings from Last 1 Encounters:   05/20/25 114/75           [x]  Reviewed chart for active Hypertension diagnosis     []  Reviewed & documented Home BP Cuff     []  Documented a Remote BP if needed & applicable     []  Scheduled necessary follow up appointments with Primary Care if needed          Provider Team Continuity:     Last PCP Visit Date: 5/7/2025          [x]  Reviewed Primary Care Provider Visits, Annual Wellness Visit, and Future          Appointments to ensure appointments have been scheduled and/or           completed        Additional Notes:  PCP appointment scheduled for 6/23/2025           Social Determinants of Health          [x]  Reviewed, completed, and/or updated the following sections:                  Food Insecurity,  Transportation Needs, Financial Resource Strain,                 Tobacco Use        Additional Notes:  UTD           Care Management, Digital Medicine, and/or Education Referrals    OPCM Risk Score: 36.4         Next Steps - Referral Actions: Digital Medicine Outcomes and Actions Taken: Pt Declined or Not Eligible

## 2025-06-23 ENCOUNTER — OFFICE VISIT (OUTPATIENT)
Dept: PRIMARY CARE CLINIC | Facility: CLINIC | Age: 40
End: 2025-06-23
Payer: COMMERCIAL

## 2025-06-23 VITALS
OXYGEN SATURATION: 98 % | HEIGHT: 61 IN | DIASTOLIC BLOOD PRESSURE: 64 MMHG | WEIGHT: 172.75 LBS | BODY MASS INDEX: 32.62 KG/M2 | RESPIRATION RATE: 18 BRPM | SYSTOLIC BLOOD PRESSURE: 100 MMHG | TEMPERATURE: 98 F | HEART RATE: 65 BPM

## 2025-06-23 DIAGNOSIS — E03.8 SUBCLINICAL HYPOTHYROIDISM: ICD-10-CM

## 2025-06-23 DIAGNOSIS — R42 VERTIGO: ICD-10-CM

## 2025-06-23 DIAGNOSIS — Z12.31 VISIT FOR SCREENING MAMMOGRAM: ICD-10-CM

## 2025-06-23 DIAGNOSIS — F41.9 ANXIETY: ICD-10-CM

## 2025-06-23 DIAGNOSIS — B00.1 FEVER BLISTER: ICD-10-CM

## 2025-06-23 DIAGNOSIS — Z86.39 HISTORY OF HASHIMOTO THYROIDITIS: Primary | ICD-10-CM

## 2025-06-23 PROCEDURE — 99999 PR PBB SHADOW E&M-EST. PATIENT-LVL IV: CPT | Mod: PBBFAC,,, | Performed by: FAMILY MEDICINE

## 2025-06-23 PROCEDURE — 3008F BODY MASS INDEX DOCD: CPT | Mod: CPTII,S$GLB,, | Performed by: FAMILY MEDICINE

## 2025-06-23 PROCEDURE — 3074F SYST BP LT 130 MM HG: CPT | Mod: CPTII,S$GLB,, | Performed by: FAMILY MEDICINE

## 2025-06-23 PROCEDURE — 1159F MED LIST DOCD IN RCRD: CPT | Mod: CPTII,S$GLB,, | Performed by: FAMILY MEDICINE

## 2025-06-23 PROCEDURE — 99213 OFFICE O/P EST LOW 20 MIN: CPT | Mod: S$GLB,,, | Performed by: FAMILY MEDICINE

## 2025-06-23 PROCEDURE — 3044F HG A1C LEVEL LT 7.0%: CPT | Mod: CPTII,S$GLB,, | Performed by: FAMILY MEDICINE

## 2025-06-23 PROCEDURE — 3078F DIAST BP <80 MM HG: CPT | Mod: CPTII,S$GLB,, | Performed by: FAMILY MEDICINE

## 2025-06-23 RX ORDER — ACYCLOVIR 50 MG/G
CREAM TOPICAL
Qty: 5 G | Refills: 5 | Status: SHIPPED | OUTPATIENT
Start: 2025-06-23

## 2025-06-23 RX ORDER — VALACYCLOVIR HYDROCHLORIDE 1 G/1
1000 TABLET, FILM COATED ORAL 2 TIMES DAILY
Qty: 60 TABLET | Refills: 3 | Status: SHIPPED | OUTPATIENT
Start: 2025-06-23 | End: 2026-06-23

## 2025-06-23 NOTE — PROGRESS NOTES
Subjective:       Patient ID: Marek Thompson is a 39 y.o. female.    Chief Complaint: Follow-up (6 week)    HPI 38 yo WF in for 6 week follow-up---patient saw rheumatologist--told none numbers looked compatible with lupus rheumatoid but did send patient for tests  Referred to endocrinology---had to do a virtual visit because no visits until September--placed on levothyroxine---did US thyroid --showed Hashimoto's thyroiditis--also had lymph nodes were swollen next to the glands may be reactive.   ENT in the past told had crystals had a tilt test that was negative--takes meclizine--dizzy exercises  Neck pressure all the time ---does have bilateral lymph node--endocrinologist felt probably secondary to Hashimoto's thyroiditis--patient be retested in July--if not better May need MRI neck --if reactive nose--occas check TB and Mono spot --though asymptomatic--or biopsy nodes --which appear to be reactive       ROS:  Skin: no psoriasis, eczema, skin cancer  HEENT: + headache heaviness always there ,no  ocular pain, blurred vision, diplopia, epistaxis, hoarseness change in voice, thyroid trouble  Lung: No pneumonia, +asthma, no Tb, wheezing, SOB, no smoking needs refills of albuterol solution  Heart: No chest pain, ankle edema, palpitations, MI, +angy murmur, no hypertension, hyperlipidemia--no stent bypass or arrhythmia  Abdomen:  No nausea, vomiting, diarrhea, constipation, ulcers, hepatitis, gallbladder disease, melena, hematochezia, hematemesis--did have an ovarian cyst/endometriosis/scarring between the colon and lining of the stomach see history of present illness  : no UTI, renal disease, stones  GYN hyst 8 1/2 yrs ago   MS: no fractures, O/A, lupus, rheumatoid, gout  Neuro: + dizziness,+ LOC, no seizures   No diabetes, + anemia, no anxiety, no depression    2 children [employer] lives  and 2 children    Objective:   Physical Exam:  General: Well nourished, well developed, no acute  distress  Skin: No lesions  HEENT: Eyes PERRLA, EOM intact, nose patent, throat noneryth   NECK: Supple, no bruits, No JVD, patient with bilateral anterior cervical lymphadenitis on each side of the thyroid the thyroid again does appear to be prominent  Lungs: Clear, no rales, rhonchi, wheezing  Heart: Regular rate and rhythm, no murmurs, gallops, or rubs  Abdomen: flat, bowel sounds positive, no tenderness, or organomegaly  MS: Range of motion and muscle strength intact  Neuro: Alert, CN intact, oriented X 3  Extremities: No cyanosis, clubbing, or edema         Assessment:       1. History of Hashimoto thyroiditis    2. Subclinical hypothyroidism    3. Vertigo    4. Anxiety    5. Visit for screening mammogram    6. Fever blister            Plan:       History of Hashimoto thyroiditis    Subclinical hypothyroidism    Vertigo    Anxiety    Visit for screening mammogram  -     Mammo Digital Screening Bilat; Future; Expected date: 06/23/2025    Fever blister            Saw endocrinologist--ultrasound of the thyroid showed Hashimoto's thyroiditis---with bilateral lymph nodes appear to be reactive--started on thyroid medications--will see patient in July redo lab---if-anterior cervical nodes bilaterally persist x3 months would consider MRI of the cervical spine---TB skin test--Monospot--chest x-ray--in ENT biopsy of the lymph nodes which would probably be reactive  Saw rheumatologist doing workup to rule out any type of collagen vascular disease  Fever blisters herpes simplex type 1 patient to take Valtrex 1 g b.i.d. times 7-10 days use acyclovir cream Q 3 hours 6 times per day x7 days to repeat p.r.n.  History of vertigo x 2 yrs --initially treated with Antivert antibiotics steroids--did fairly well--was occurring about once a month--now a curling daily for the last 2 weeks---physical exam some dullness the right tympanic membrane some pressure in the right ear---will treat with Zithromax/prednisone 20 mg q.d. times 10  days Claritin 1 p.o. q.d. gee 25 q.6 for dizziness Zofran for nausea vomiting-  Due to recent episodes associated with nausea vomiting at least twice a day will redo workup with lab CBCs CMP lipids T4 TSH sed rate DANE rheumatoid factor treponemal pallidum test magnesium Monospot TB skin test---had MRI July 2024 chest x-ray EKG July 2024  Hyperglycemia Last lab 140 patient interested in semaglutide  History of anemia but mi 36.1  Needs to do dizzy exercises  MRI the brain was normal  Consider For completeness if symptoms stay ENT consult possible biopsy lymph node in evaluate dizziness then neurology consult other test  carotid ultrasound 24 hour Holter echocardiogram doubt needs repeat MRI of the brain and neurology consult--heaviness in the head appears to be benign but will have neurologist evaluate after workup complete   Return 3 mo if lymph nodes remain elevated consider MRI of the neck--Monospot TB skin test chest x-ray with possible biopsy by ENT

## 2025-07-20 RX ORDER — LEVOTHYROXINE SODIUM 50 UG/1
50 TABLET ORAL
Qty: 30 TABLET | Refills: 11 | Status: SHIPPED | OUTPATIENT
Start: 2025-07-20 | End: 2026-07-20

## 2025-08-09 ENCOUNTER — PATIENT MESSAGE (OUTPATIENT)
Dept: ENDOCRINOLOGY | Facility: CLINIC | Age: 40
End: 2025-08-09
Payer: COMMERCIAL

## 2025-08-12 ENCOUNTER — PATIENT MESSAGE (OUTPATIENT)
Dept: INTERNAL MEDICINE | Facility: CLINIC | Age: 40
End: 2025-08-12
Payer: COMMERCIAL

## 2025-08-19 ENCOUNTER — OFFICE VISIT (OUTPATIENT)
Dept: OBSTETRICS AND GYNECOLOGY | Facility: CLINIC | Age: 40
End: 2025-08-19
Payer: COMMERCIAL

## 2025-08-19 VITALS
DIASTOLIC BLOOD PRESSURE: 78 MMHG | SYSTOLIC BLOOD PRESSURE: 121 MMHG | HEIGHT: 61 IN | WEIGHT: 170.5 LBS | HEART RATE: 71 BPM | BODY MASS INDEX: 32.19 KG/M2

## 2025-08-19 DIAGNOSIS — N95.1 PERIMENOPAUSAL SYMPTOMS: ICD-10-CM

## 2025-08-19 DIAGNOSIS — N89.8 VAGINAL IRRITATION: ICD-10-CM

## 2025-08-19 DIAGNOSIS — Z01.419 WELL WOMAN EXAM WITH ROUTINE GYNECOLOGICAL EXAM: Primary | ICD-10-CM

## 2025-08-19 LAB
BILIRUB UR QL STRIP.AUTO: NEGATIVE
CLARITY UR: CLEAR
COLOR UR AUTO: COLORLESS
GLUCOSE UR QL STRIP: NEGATIVE
HGB UR QL STRIP: NEGATIVE
KETONES UR QL STRIP: NEGATIVE
LEUKOCYTE ESTERASE UR QL STRIP: NEGATIVE
NITRITE UR QL STRIP: NEGATIVE
PH UR STRIP: 6 [PH]
PROT UR QL STRIP: NEGATIVE
SP GR UR STRIP: <1.005
UROBILINOGEN UR STRIP-ACNC: NEGATIVE EU/DL

## 2025-08-19 PROCEDURE — 99999 PR PBB SHADOW E&M-EST. PATIENT-LVL III: CPT | Mod: PBBFAC,,, | Performed by: OBSTETRICS & GYNECOLOGY

## 2025-08-19 PROCEDURE — 87798 DETECT AGENT NOS DNA AMP: CPT | Performed by: OBSTETRICS & GYNECOLOGY

## 2025-08-19 PROCEDURE — 3044F HG A1C LEVEL LT 7.0%: CPT | Mod: CPTII,S$GLB,, | Performed by: OBSTETRICS & GYNECOLOGY

## 2025-08-19 PROCEDURE — 3074F SYST BP LT 130 MM HG: CPT | Mod: CPTII,S$GLB,, | Performed by: OBSTETRICS & GYNECOLOGY

## 2025-08-19 PROCEDURE — 99386 PREV VISIT NEW AGE 40-64: CPT | Mod: S$GLB,,, | Performed by: OBSTETRICS & GYNECOLOGY

## 2025-08-19 PROCEDURE — 1160F RVW MEDS BY RX/DR IN RCRD: CPT | Mod: CPTII,S$GLB,, | Performed by: OBSTETRICS & GYNECOLOGY

## 2025-08-19 PROCEDURE — 81003 URINALYSIS AUTO W/O SCOPE: CPT | Performed by: OBSTETRICS & GYNECOLOGY

## 2025-08-19 PROCEDURE — 3078F DIAST BP <80 MM HG: CPT | Mod: CPTII,S$GLB,, | Performed by: OBSTETRICS & GYNECOLOGY

## 2025-08-19 PROCEDURE — 81515 NFCT DS BV&VAGINITIS DNA ALG: CPT | Performed by: OBSTETRICS & GYNECOLOGY

## 2025-08-19 PROCEDURE — 1159F MED LIST DOCD IN RCRD: CPT | Mod: CPTII,S$GLB,, | Performed by: OBSTETRICS & GYNECOLOGY

## 2025-08-19 PROCEDURE — 3008F BODY MASS INDEX DOCD: CPT | Mod: CPTII,S$GLB,, | Performed by: OBSTETRICS & GYNECOLOGY

## 2025-08-19 PROCEDURE — 87529 HSV DNA AMP PROBE: CPT | Mod: 59 | Performed by: OBSTETRICS & GYNECOLOGY

## 2025-08-19 RX ORDER — FLUCONAZOLE 150 MG/1
150 TABLET ORAL
Qty: 2 TABLET | Refills: 0 | Status: SHIPPED | OUTPATIENT
Start: 2025-08-19 | End: 2025-08-21 | Stop reason: SDUPTHER

## 2025-08-21 LAB
BACTERIAL VAGINOSIS DNA (OHS): DETECTED
CANDIDA GLABRATA/KRUSEI DNA (OHS): NOT DETECTED
CANDIDA SPECIES DNA (OHS): NOT DETECTED
TRICHOMONAS VAGINALIS DNA (OHS): NOT DETECTED

## 2025-08-23 LAB
HSV1 DNA SPEC QL NAA+PROBE: NEGATIVE
HSV2 DNA SPEC QL NAA+PROBE: NEGATIVE
SPECIMEN SOURCE: NORMAL